# Patient Record
Sex: FEMALE | Race: WHITE | NOT HISPANIC OR LATINO | Employment: OTHER | URBAN - METROPOLITAN AREA
[De-identification: names, ages, dates, MRNs, and addresses within clinical notes are randomized per-mention and may not be internally consistent; named-entity substitution may affect disease eponyms.]

---

## 2022-01-14 DIAGNOSIS — Z01.818 PRE-OP TESTING: Primary | ICD-10-CM

## 2022-01-17 ENCOUNTER — ANESTHESIA EVENT (OUTPATIENT)
Dept: PERIOP | Facility: HOSPITAL | Age: 72
DRG: 470 | End: 2022-01-17
Payer: COMMERCIAL

## 2022-01-25 ENCOUNTER — APPOINTMENT (OUTPATIENT)
Dept: LAB | Facility: MEDICAL CENTER | Age: 72
End: 2022-01-25
Payer: COMMERCIAL

## 2022-01-25 ENCOUNTER — APPOINTMENT (OUTPATIENT)
Dept: RADIOLOGY | Facility: MEDICAL CENTER | Age: 72
End: 2022-01-25
Payer: COMMERCIAL

## 2022-01-25 ENCOUNTER — CLINICAL SUPPORT (OUTPATIENT)
Dept: URGENT CARE | Facility: MEDICAL CENTER | Age: 72
End: 2022-01-25
Payer: COMMERCIAL

## 2022-01-25 DIAGNOSIS — Z01.818 PRE-OP TESTING: ICD-10-CM

## 2022-01-25 LAB
ABO GROUP BLD: NORMAL
ALBUMIN SERPL BCP-MCNC: 3.8 G/DL (ref 3.5–5)
ALP SERPL-CCNC: 122 U/L (ref 46–116)
ALT SERPL W P-5'-P-CCNC: 22 U/L (ref 12–78)
ANION GAP SERPL CALCULATED.3IONS-SCNC: 3 MMOL/L (ref 4–13)
APTT PPP: 33 SECONDS (ref 23–37)
AST SERPL W P-5'-P-CCNC: 18 U/L (ref 5–45)
ATRIAL RATE: 71 BPM
BASOPHILS # BLD AUTO: 0.08 THOUSANDS/ΜL (ref 0–0.1)
BASOPHILS NFR BLD AUTO: 2 % (ref 0–1)
BILIRUB SERPL-MCNC: 0.64 MG/DL (ref 0.2–1)
BLD GP AB SCN SERPL QL: NEGATIVE
BUN SERPL-MCNC: 18 MG/DL (ref 5–25)
CALCIUM SERPL-MCNC: 9.8 MG/DL (ref 8.3–10.1)
CHLORIDE SERPL-SCNC: 108 MMOL/L (ref 100–108)
CO2 SERPL-SCNC: 29 MMOL/L (ref 21–32)
CREAT SERPL-MCNC: 0.78 MG/DL (ref 0.6–1.3)
CRP SERPL QL: <3 MG/L
EOSINOPHIL # BLD AUTO: 0.16 THOUSAND/ΜL (ref 0–0.61)
EOSINOPHIL NFR BLD AUTO: 3 % (ref 0–6)
ERYTHROCYTE [DISTWIDTH] IN BLOOD BY AUTOMATED COUNT: 16.3 % (ref 11.6–15.1)
EST. AVERAGE GLUCOSE BLD GHB EST-MCNC: 128 MG/DL
FERRITIN SERPL-MCNC: 5 NG/ML (ref 8–388)
GFR SERPL CREATININE-BSD FRML MDRD: 75 ML/MIN/1.73SQ M
GLUCOSE P FAST SERPL-MCNC: 109 MG/DL (ref 65–99)
HBA1C MFR BLD: 6.1 %
HCT VFR BLD AUTO: 37.1 % (ref 34.8–46.1)
HGB BLD-MCNC: 11.2 G/DL (ref 11.5–15.4)
IMM GRANULOCYTES # BLD AUTO: 0.01 THOUSAND/UL (ref 0–0.2)
IMM GRANULOCYTES NFR BLD AUTO: 0 % (ref 0–2)
INR PPP: 1.02 (ref 0.84–1.19)
IRON SATN MFR SERPL: 9 % (ref 15–50)
IRON SERPL-MCNC: 41 UG/DL (ref 50–170)
LYMPHOCYTES # BLD AUTO: 1.1 THOUSANDS/ΜL (ref 0.6–4.47)
LYMPHOCYTES NFR BLD AUTO: 20 % (ref 14–44)
MCH RBC QN AUTO: 25.2 PG (ref 26.8–34.3)
MCHC RBC AUTO-ENTMCNC: 30.2 G/DL (ref 31.4–37.4)
MCV RBC AUTO: 83 FL (ref 82–98)
MONOCYTES # BLD AUTO: 0.42 THOUSAND/ΜL (ref 0.17–1.22)
MONOCYTES NFR BLD AUTO: 8 % (ref 4–12)
NEUTROPHILS # BLD AUTO: 3.64 THOUSANDS/ΜL (ref 1.85–7.62)
NEUTS SEG NFR BLD AUTO: 67 % (ref 43–75)
NRBC BLD AUTO-RTO: 0 /100 WBCS
PLATELET # BLD AUTO: 188 THOUSANDS/UL (ref 149–390)
POTASSIUM SERPL-SCNC: 4.1 MMOL/L (ref 3.5–5.3)
PR INTERVAL: 166 MS
PROT SERPL-MCNC: 7.2 G/DL (ref 6.4–8.2)
PROTHROMBIN TIME: 13 SECONDS (ref 11.6–14.5)
QRS AXIS: -29 DEGREES
QRSD INTERVAL: 70 MS
QT INTERVAL: 420 MS
QTC INTERVAL: 456 MS
RBC # BLD AUTO: 4.45 MILLION/UL (ref 3.81–5.12)
RETICS # AUTO: NORMAL 10*3/UL (ref 14097–95744)
RETICS # CALC: 1.15 % (ref 0.37–1.87)
RH BLD: POSITIVE
SODIUM SERPL-SCNC: 140 MMOL/L (ref 136–145)
SPECIMEN EXPIRATION DATE: NORMAL
T WAVE AXIS: 15 DEGREES
TIBC SERPL-MCNC: 477 UG/DL (ref 250–450)
VENTRICULAR RATE: 71 BPM
WBC # BLD AUTO: 5.41 THOUSAND/UL (ref 4.31–10.16)

## 2022-01-25 PROCEDURE — 83540 ASSAY OF IRON: CPT

## 2022-01-25 PROCEDURE — 82728 ASSAY OF FERRITIN: CPT

## 2022-01-25 PROCEDURE — 85730 THROMBOPLASTIN TIME PARTIAL: CPT

## 2022-01-25 PROCEDURE — 80053 COMPREHEN METABOLIC PANEL: CPT

## 2022-01-25 PROCEDURE — 93010 ELECTROCARDIOGRAM REPORT: CPT | Performed by: INTERNAL MEDICINE

## 2022-01-25 PROCEDURE — 83550 IRON BINDING TEST: CPT

## 2022-01-25 PROCEDURE — 83036 HEMOGLOBIN GLYCOSYLATED A1C: CPT

## 2022-01-25 PROCEDURE — 86850 RBC ANTIBODY SCREEN: CPT

## 2022-01-25 PROCEDURE — 71046 X-RAY EXAM CHEST 2 VIEWS: CPT

## 2022-01-25 PROCEDURE — 86900 BLOOD TYPING SEROLOGIC ABO: CPT

## 2022-01-25 PROCEDURE — 86140 C-REACTIVE PROTEIN: CPT

## 2022-01-25 PROCEDURE — 85045 AUTOMATED RETICULOCYTE COUNT: CPT

## 2022-01-25 PROCEDURE — 93005 ELECTROCARDIOGRAM TRACING: CPT

## 2022-01-25 PROCEDURE — 36415 COLL VENOUS BLD VENIPUNCTURE: CPT

## 2022-01-25 PROCEDURE — 85610 PROTHROMBIN TIME: CPT

## 2022-01-25 PROCEDURE — 86901 BLOOD TYPING SEROLOGIC RH(D): CPT

## 2022-01-25 PROCEDURE — 85025 COMPLETE CBC W/AUTO DIFF WBC: CPT

## 2022-02-08 RX ORDER — AMLODIPINE BESYLATE 5 MG/1
5 TABLET ORAL DAILY
COMMUNITY

## 2022-02-08 RX ORDER — ACETAMINOPHEN 500 MG
500-1000 TABLET ORAL EVERY 6 HOURS PRN
COMMUNITY

## 2022-02-08 RX ORDER — VENLAFAXINE HYDROCHLORIDE 150 MG/1
150 CAPSULE, EXTENDED RELEASE ORAL DAILY
COMMUNITY

## 2022-02-08 RX ORDER — FERROUS SULFATE 325(65) MG
325 TABLET ORAL
COMMUNITY

## 2022-02-08 RX ORDER — LEVOTHYROXINE SODIUM 137 UG/1
CAPSULE ORAL
COMMUNITY

## 2022-02-08 RX ORDER — ASPIRIN 81 MG/1
81 TABLET ORAL DAILY
Status: ON HOLD | COMMUNITY
End: 2022-02-17 | Stop reason: SDUPTHER

## 2022-02-08 NOTE — PRE-PROCEDURE INSTRUCTIONS
Pre-Surgery Instructions:   Medication Instructions    acetaminophen (TYLENOL) 500 mg tablet Instructed patient per Anesthesia Guidelines   amLODIPine (NORVASC) 5 mg tablet Instructed patient per Anesthesia Guidelines   aspirin (ECOTRIN LOW STRENGTH) 81 mg EC tablet Patient was instructed by Physician and understands   Calcium-Vitamin D-Vitamin K (VIACTIV PO) Instructed patient per Anesthesia Guidelines   ferrous sulfate 325 (65 Fe) mg tablet Instructed patient per Anesthesia Guidelines   Levothyroxine Sodium 137 MCG CAPS Instructed patient per Anesthesia Guidelines   Multiple Vitamins-Minerals (CENTRUM SILVER PO) Instructed patient per Anesthesia Guidelines   venlafaxine (EFFEXOR-XR) 150 mg 24 hr capsule Instructed patient per Anesthesia Guidelines  Instructed to take amlodipine/ venlafaxine and levothyroxine am of surgery with sip of water per anesthesia guidelines  Can take tylenol if needed am of surgery

## 2022-02-15 ENCOUNTER — ANESTHESIA (OUTPATIENT)
Dept: PERIOP | Facility: HOSPITAL | Age: 72
DRG: 470 | End: 2022-02-15
Payer: COMMERCIAL

## 2022-02-15 ENCOUNTER — APPOINTMENT (OUTPATIENT)
Dept: RADIOLOGY | Facility: HOSPITAL | Age: 72
DRG: 470 | End: 2022-02-15
Payer: COMMERCIAL

## 2022-02-15 ENCOUNTER — HOSPITAL ENCOUNTER (INPATIENT)
Facility: HOSPITAL | Age: 72
LOS: 2 days | Discharge: HOME HEALTH | DRG: 470 | End: 2022-02-18
Attending: ORTHOPAEDIC SURGERY | Admitting: ORTHOPAEDIC SURGERY
Payer: COMMERCIAL

## 2022-02-15 DIAGNOSIS — Z96.651 STATUS POST RIGHT KNEE REPLACEMENT: Primary | ICD-10-CM

## 2022-02-15 DIAGNOSIS — Z96.651 S/P TKR (TOTAL KNEE REPLACEMENT) USING CEMENT, RIGHT: ICD-10-CM

## 2022-02-15 PROBLEM — E66.9 OBESITY: Status: ACTIVE | Noted: 2022-02-15

## 2022-02-15 LAB
ABO GROUP BLD: NORMAL
ERYTHROCYTE [DISTWIDTH] IN BLOOD BY AUTOMATED COUNT: 19.3 % (ref 11.6–15.1)
FLUAV RNA RESP QL NAA+PROBE: NEGATIVE
FLUBV RNA RESP QL NAA+PROBE: NEGATIVE
HCT VFR BLD AUTO: 36.7 % (ref 34.8–46.1)
HGB BLD-MCNC: 11.4 G/DL (ref 11.5–15.4)
MCH RBC QN AUTO: 27.3 PG (ref 26.8–34.3)
MCHC RBC AUTO-ENTMCNC: 31.1 G/DL (ref 31.4–37.4)
MCV RBC AUTO: 88 FL (ref 82–98)
PLATELET # BLD AUTO: 132 THOUSANDS/UL (ref 149–390)
RBC # BLD AUTO: 4.17 MILLION/UL (ref 3.81–5.12)
RH BLD: POSITIVE
RSV RNA RESP QL NAA+PROBE: NEGATIVE
SARS-COV-2 RNA RESP QL NAA+PROBE: NEGATIVE
WBC # BLD AUTO: 9.29 THOUSAND/UL (ref 4.31–10.16)

## 2022-02-15 PROCEDURE — C1776 JOINT DEVICE (IMPLANTABLE): HCPCS | Performed by: ORTHOPAEDIC SURGERY

## 2022-02-15 PROCEDURE — 73560 X-RAY EXAM OF KNEE 1 OR 2: CPT

## 2022-02-15 PROCEDURE — 97163 PT EVAL HIGH COMPLEX 45 MIN: CPT

## 2022-02-15 PROCEDURE — C1713 ANCHOR/SCREW BN/BN,TIS/BN: HCPCS | Performed by: ORTHOPAEDIC SURGERY

## 2022-02-15 PROCEDURE — 85027 COMPLETE CBC AUTOMATED: CPT | Performed by: ORTHOPAEDIC SURGERY

## 2022-02-15 PROCEDURE — 0SRC0J9 REPLACEMENT OF RIGHT KNEE JOINT WITH SYNTHETIC SUBSTITUTE, CEMENTED, OPEN APPROACH: ICD-10-PCS | Performed by: ORTHOPAEDIC SURGERY

## 2022-02-15 PROCEDURE — 0241U HB NFCT DS VIR RESP RNA 4 TRGT: CPT | Performed by: ORTHOPAEDIC SURGERY

## 2022-02-15 DEVICE — SIGMA FEMORAL POSTERIOR STABILIZED CEMENTED SIZE 4N RIGHT
Type: IMPLANTABLE DEVICE | Site: KNEE | Status: FUNCTIONAL
Brand: SIGMA

## 2022-02-15 DEVICE — P.F.C. SIGMA TIBIAL TRAY FIXED BEARING MODULAR COCR 3 CEMENTED
Type: IMPLANTABLE DEVICE | Site: KNEE | Status: FUNCTIONAL
Brand: P.F.C. SIGMA

## 2022-02-15 DEVICE — SMARTSET HIGH PERFORMANCE MV MEDIUM VISCOSITY BONE CEMENT 40G
Type: IMPLANTABLE DEVICE | Site: KNEE | Status: FUNCTIONAL
Brand: SMARTSET

## 2022-02-15 DEVICE — P.F.C. SIGMA TIBIAL INSERT FIXED BEARING STABILIZED 3 8MM XLK
Type: IMPLANTABLE DEVICE | Site: KNEE | Status: FUNCTIONAL
Brand: P.F.C. SIGMA

## 2022-02-15 RX ORDER — NEOSTIGMINE METHYLSULFATE 1 MG/ML
INJECTION INTRAVENOUS AS NEEDED
Status: DISCONTINUED | OUTPATIENT
Start: 2022-02-15 | End: 2022-02-15

## 2022-02-15 RX ORDER — MIDAZOLAM HYDROCHLORIDE 2 MG/2ML
INJECTION, SOLUTION INTRAMUSCULAR; INTRAVENOUS AS NEEDED
Status: DISCONTINUED | OUTPATIENT
Start: 2022-02-15 | End: 2022-02-15

## 2022-02-15 RX ORDER — ROPIVACAINE HYDROCHLORIDE 5 MG/ML
INJECTION, SOLUTION EPIDURAL; INFILTRATION; PERINEURAL AS NEEDED
Status: DISCONTINUED | OUTPATIENT
Start: 2022-02-15 | End: 2022-02-15

## 2022-02-15 RX ORDER — OXYCODONE HCL 10 MG/1
10 TABLET, FILM COATED, EXTENDED RELEASE ORAL EVERY 12 HOURS SCHEDULED
Status: DISCONTINUED | OUTPATIENT
Start: 2022-02-15 | End: 2022-02-18 | Stop reason: HOSPADM

## 2022-02-15 RX ORDER — ROCURONIUM BROMIDE 10 MG/ML
INJECTION, SOLUTION INTRAVENOUS AS NEEDED
Status: DISCONTINUED | OUTPATIENT
Start: 2022-02-15 | End: 2022-02-15

## 2022-02-15 RX ORDER — ONDANSETRON 4 MG/1
4 TABLET, ORALLY DISINTEGRATING ORAL EVERY 6 HOURS PRN
Status: DISCONTINUED | OUTPATIENT
Start: 2022-02-15 | End: 2022-02-18 | Stop reason: HOSPADM

## 2022-02-15 RX ORDER — FENTANYL CITRATE/PF 50 MCG/ML
25 SYRINGE (ML) INJECTION
Status: DISCONTINUED | OUTPATIENT
Start: 2022-02-15 | End: 2022-02-15 | Stop reason: HOSPADM

## 2022-02-15 RX ORDER — DOCUSATE SODIUM 100 MG/1
100 CAPSULE, LIQUID FILLED ORAL 2 TIMES DAILY
Status: DISCONTINUED | OUTPATIENT
Start: 2022-02-15 | End: 2022-02-18 | Stop reason: HOSPADM

## 2022-02-15 RX ORDER — MAGNESIUM HYDROXIDE 1200 MG/15ML
LIQUID ORAL AS NEEDED
Status: DISCONTINUED | OUTPATIENT
Start: 2022-02-15 | End: 2022-02-15 | Stop reason: HOSPADM

## 2022-02-15 RX ORDER — ONDANSETRON 2 MG/ML
INJECTION INTRAMUSCULAR; INTRAVENOUS AS NEEDED
Status: DISCONTINUED | OUTPATIENT
Start: 2022-02-15 | End: 2022-02-15

## 2022-02-15 RX ORDER — KETOROLAC TROMETHAMINE 30 MG/ML
15 INJECTION, SOLUTION INTRAMUSCULAR; INTRAVENOUS EVERY 6 HOURS PRN
Status: DISCONTINUED | OUTPATIENT
Start: 2022-02-15 | End: 2022-02-16

## 2022-02-15 RX ORDER — EPHEDRINE SULFATE 50 MG/ML
INJECTION INTRAVENOUS AS NEEDED
Status: DISCONTINUED | OUTPATIENT
Start: 2022-02-15 | End: 2022-02-15

## 2022-02-15 RX ORDER — OXYCODONE HYDROCHLORIDE AND ACETAMINOPHEN 5; 325 MG/1; MG/1
1 TABLET ORAL EVERY 4 HOURS PRN
Status: DISCONTINUED | OUTPATIENT
Start: 2022-02-15 | End: 2022-02-18 | Stop reason: HOSPADM

## 2022-02-15 RX ORDER — HYDROMORPHONE HCL/PF 1 MG/ML
0.5 SYRINGE (ML) INJECTION EVERY 2 HOUR PRN
Status: ACTIVE | OUTPATIENT
Start: 2022-02-15 | End: 2022-02-17

## 2022-02-15 RX ORDER — CHLORHEXIDINE GLUCONATE 4 G/100ML
SOLUTION TOPICAL DAILY PRN
Status: DISCONTINUED | OUTPATIENT
Start: 2022-02-15 | End: 2022-02-15 | Stop reason: HOSPADM

## 2022-02-15 RX ORDER — DEXAMETHASONE SODIUM PHOSPHATE 4 MG/ML
INJECTION, SOLUTION INTRA-ARTICULAR; INTRALESIONAL; INTRAMUSCULAR; INTRAVENOUS; SOFT TISSUE AS NEEDED
Status: DISCONTINUED | OUTPATIENT
Start: 2022-02-15 | End: 2022-02-15

## 2022-02-15 RX ORDER — SODIUM CHLORIDE, SODIUM LACTATE, POTASSIUM CHLORIDE, CALCIUM CHLORIDE 600; 310; 30; 20 MG/100ML; MG/100ML; MG/100ML; MG/100ML
125 INJECTION, SOLUTION INTRAVENOUS CONTINUOUS
Status: DISCONTINUED | OUTPATIENT
Start: 2022-02-15 | End: 2022-02-16

## 2022-02-15 RX ORDER — VANCOMYCIN HYDROCHLORIDE 1 G/200ML
1000 INJECTION, SOLUTION INTRAVENOUS EVERY 12 HOURS
Status: COMPLETED | OUTPATIENT
Start: 2022-02-15 | End: 2022-02-16

## 2022-02-15 RX ORDER — FENTANYL CITRATE 50 UG/ML
INJECTION, SOLUTION INTRAMUSCULAR; INTRAVENOUS AS NEEDED
Status: DISCONTINUED | OUTPATIENT
Start: 2022-02-15 | End: 2022-02-15

## 2022-02-15 RX ORDER — VANCOMYCIN HYDROCHLORIDE 1 G/200ML
1000 INJECTION, SOLUTION INTRAVENOUS ONCE
Status: COMPLETED | OUTPATIENT
Start: 2022-02-15 | End: 2022-02-15

## 2022-02-15 RX ORDER — HYDROMORPHONE HCL/PF 1 MG/ML
0.2 SYRINGE (ML) INJECTION EVERY 6 HOURS PRN
Status: DISCONTINUED | OUTPATIENT
Start: 2022-02-15 | End: 2022-02-18 | Stop reason: HOSPADM

## 2022-02-15 RX ORDER — GLYCOPYRROLATE 0.2 MG/ML
INJECTION INTRAMUSCULAR; INTRAVENOUS AS NEEDED
Status: DISCONTINUED | OUTPATIENT
Start: 2022-02-15 | End: 2022-02-15

## 2022-02-15 RX ORDER — ONDANSETRON 2 MG/ML
4 INJECTION INTRAMUSCULAR; INTRAVENOUS ONCE AS NEEDED
Status: DISCONTINUED | OUTPATIENT
Start: 2022-02-15 | End: 2022-02-15 | Stop reason: HOSPADM

## 2022-02-15 RX ORDER — HYDROMORPHONE HCL/PF 1 MG/ML
0.5 SYRINGE (ML) INJECTION
Status: DISCONTINUED | OUTPATIENT
Start: 2022-02-15 | End: 2022-02-15 | Stop reason: HOSPADM

## 2022-02-15 RX ORDER — CHLORHEXIDINE GLUCONATE 0.12 MG/ML
15 RINSE ORAL ONCE
Status: COMPLETED | OUTPATIENT
Start: 2022-02-15 | End: 2022-02-15

## 2022-02-15 RX ORDER — FONDAPARINUX SODIUM 2.5 MG/.5ML
2.5 INJECTION SUBCUTANEOUS DAILY
Status: DISCONTINUED | OUTPATIENT
Start: 2022-02-15 | End: 2022-02-18 | Stop reason: HOSPADM

## 2022-02-15 RX ORDER — ACETAMINOPHEN 325 MG/1
650 TABLET ORAL EVERY 4 HOURS PRN
Status: DISCONTINUED | OUTPATIENT
Start: 2022-02-15 | End: 2022-02-18 | Stop reason: HOSPADM

## 2022-02-15 RX ORDER — PROPOFOL 10 MG/ML
INJECTION, EMULSION INTRAVENOUS AS NEEDED
Status: DISCONTINUED | OUTPATIENT
Start: 2022-02-15 | End: 2022-02-15

## 2022-02-15 RX ORDER — LIDOCAINE HYDROCHLORIDE 20 MG/ML
INJECTION, SOLUTION EPIDURAL; INFILTRATION; INTRACAUDAL; PERINEURAL AS NEEDED
Status: DISCONTINUED | OUTPATIENT
Start: 2022-02-15 | End: 2022-02-15

## 2022-02-15 RX ADMIN — ROPIVACAINE HYDROCHLORIDE 15 ML: 5 INJECTION, SOLUTION EPIDURAL; INFILTRATION; PERINEURAL at 10:23

## 2022-02-15 RX ADMIN — FENTANYL CITRATE 25 MCG: 50 INJECTION, SOLUTION INTRAMUSCULAR; INTRAVENOUS at 14:17

## 2022-02-15 RX ADMIN — CHLORHEXIDINE GLUCONATE 0.12% ORAL RINSE 15 ML: 1.2 LIQUID ORAL at 08:37

## 2022-02-15 RX ADMIN — FENTANYL CITRATE 100 MCG: 50 INJECTION INTRAMUSCULAR; INTRAVENOUS at 10:16

## 2022-02-15 RX ADMIN — EPHEDRINE SULFATE 10 MG: 50 INJECTION, SOLUTION INTRAVENOUS at 10:59

## 2022-02-15 RX ADMIN — OXYCODONE HYDROCHLORIDE 10 MG: 10 TABLET, FILM COATED, EXTENDED RELEASE ORAL at 20:47

## 2022-02-15 RX ADMIN — SODIUM CHLORIDE, SODIUM LACTATE, POTASSIUM CHLORIDE, AND CALCIUM CHLORIDE 125 ML/HR: .6; .31; .03; .02 INJECTION, SOLUTION INTRAVENOUS at 15:07

## 2022-02-15 RX ADMIN — FENTANYL CITRATE 25 MCG: 50 INJECTION, SOLUTION INTRAMUSCULAR; INTRAVENOUS at 14:06

## 2022-02-15 RX ADMIN — HYDROMORPHONE HYDROCHLORIDE 0.5 MG: 1 INJECTION, SOLUTION INTRAMUSCULAR; INTRAVENOUS; SUBCUTANEOUS at 14:44

## 2022-02-15 RX ADMIN — FONDAPARINUX SODIUM 2.5 MG: 2.5 INJECTION, SOLUTION SUBCUTANEOUS at 17:46

## 2022-02-15 RX ADMIN — ROCURONIUM BROMIDE 20 MG: 50 INJECTION, SOLUTION INTRAVENOUS at 12:09

## 2022-02-15 RX ADMIN — SODIUM CHLORIDE, SODIUM LACTATE, POTASSIUM CHLORIDE, AND CALCIUM CHLORIDE 125 ML/HR: .6; .31; .03; .02 INJECTION, SOLUTION INTRAVENOUS at 08:54

## 2022-02-15 RX ADMIN — EPHEDRINE SULFATE 10 MG: 50 INJECTION, SOLUTION INTRAVENOUS at 11:35

## 2022-02-15 RX ADMIN — VANCOMYCIN HYDROCHLORIDE 1000 MG: 1 INJECTION, SOLUTION INTRAVENOUS at 10:38

## 2022-02-15 RX ADMIN — SODIUM CHLORIDE, SODIUM LACTATE, POTASSIUM CHLORIDE, AND CALCIUM CHLORIDE: .6; .31; .03; .02 INJECTION, SOLUTION INTRAVENOUS at 12:03

## 2022-02-15 RX ADMIN — FENTANYL CITRATE 50 MCG: 50 INJECTION INTRAMUSCULAR; INTRAVENOUS at 10:21

## 2022-02-15 RX ADMIN — PROPOFOL 50 MG: 10 INJECTION, EMULSION INTRAVENOUS at 11:26

## 2022-02-15 RX ADMIN — VANCOMYCIN HYDROCHLORIDE 1000 MG: 1 INJECTION, SOLUTION INTRAVENOUS at 21:34

## 2022-02-15 RX ADMIN — PROPOFOL 50 MG: 10 INJECTION, EMULSION INTRAVENOUS at 13:02

## 2022-02-15 RX ADMIN — SODIUM CHLORIDE, SODIUM LACTATE, POTASSIUM CHLORIDE, AND CALCIUM CHLORIDE 125 ML/HR: .6; .31; .03; .02 INJECTION, SOLUTION INTRAVENOUS at 10:34

## 2022-02-15 RX ADMIN — EPHEDRINE SULFATE 15 MG: 50 INJECTION, SOLUTION INTRAVENOUS at 10:56

## 2022-02-15 RX ADMIN — ROPIVACAINE HYDROCHLORIDE 25 ML: 5 INJECTION, SOLUTION EPIDURAL; INFILTRATION; PERINEURAL at 10:30

## 2022-02-15 RX ADMIN — DOCUSATE SODIUM 100 MG: 100 CAPSULE ORAL at 17:46

## 2022-02-15 RX ADMIN — MIDAZOLAM 2 MG: 1 INJECTION INTRAMUSCULAR; INTRAVENOUS at 10:16

## 2022-02-15 RX ADMIN — PROPOFOL 150 MG: 10 INJECTION, EMULSION INTRAVENOUS at 10:49

## 2022-02-15 RX ADMIN — ROCURONIUM BROMIDE 50 MG: 50 INJECTION, SOLUTION INTRAVENOUS at 10:49

## 2022-02-15 RX ADMIN — DEXAMETHASONE SODIUM PHOSPHATE 8 MG: 4 INJECTION INTRA-ARTICULAR; INTRALESIONAL; INTRAMUSCULAR; INTRAVENOUS; SOFT TISSUE at 10:49

## 2022-02-15 RX ADMIN — LIDOCAINE HYDROCHLORIDE 100 MG: 20 INJECTION, SOLUTION EPIDURAL; INFILTRATION; INTRACAUDAL; PERINEURAL at 10:49

## 2022-02-15 RX ADMIN — GLYCOPYRROLATE 0.4 MG: 0.2 INJECTION, SOLUTION INTRAMUSCULAR; INTRAVENOUS at 13:06

## 2022-02-15 RX ADMIN — NEOSTIGMINE METHYLSULFATE 3 MG: 1 INJECTION INTRAVENOUS at 13:06

## 2022-02-15 RX ADMIN — ONDANSETRON 4 MG: 2 INJECTION INTRAMUSCULAR; INTRAVENOUS at 12:52

## 2022-02-15 RX ADMIN — ROCURONIUM BROMIDE 20 MG: 50 INJECTION, SOLUTION INTRAVENOUS at 11:37

## 2022-02-15 RX ADMIN — OXYCODONE HYDROCHLORIDE AND ACETAMINOPHEN 1 TABLET: 5; 325 TABLET ORAL at 16:29

## 2022-02-15 RX ADMIN — TRANEXAMIC ACID 1000 MG: 1 INJECTION, SOLUTION INTRAVENOUS at 10:57

## 2022-02-15 RX ADMIN — FENTANYL CITRATE 50 MCG: 50 INJECTION INTRAMUSCULAR; INTRAVENOUS at 11:25

## 2022-02-15 NOTE — OP NOTE
PERATIVE REPORT  PATIENT NAME: Lalito Kitchen    :  1950  MRN: [id number]  Pt Location: [facility] OR ROOM 03    SURGERY DATE: 2/15/2022    Surgeon(s) and Role:     * Solo Connell MD - Primary    Preop Diagnosis:  Pain in right knee [M25 561]  Unilateral primary osteoarthritis, right knee [M17 11]    Post-Op Diagnosis Codes:     * Pain in right knee [M25 561]     * Unilateral primary osteoarthritis, right knee [M17 11]    Procedure(s) (LRB):  ARTHROPLASTY KNEE TOTAL (Right)    Specimen(s):  * No specimens in log *    Estimated Blood Loss:   50 mL    Drains:  Autologus Reinfusion/Drain Device 1 Right Knee (Active)   Number of days: 0       [REMOVED] Closed/Suction Drain Right Leg 10 Fr  (Removed)   Number of days: 0       Anesthesia Type:   General w/ Femoral / Sciatic Block    Operative Indications:  Pain in right knee [M25 561]  Unilateral primary osteoarthritis, right knee [M17 11]  Pain    Operative Findings:    Tricompartmental DJD right knee    Complications:   None  None    Procedure and Technique:  Right TKA DePuy SIGMA posterior stabilized / 4 narrow femur / 3 tibia / 8 mm poly with stem for stabilization / reinfusion 1/ inch hemovac  I was present for the entire procedureDATE OF PROCEDURE: 2/15/2022    PREOPERATIVE DIAGNOSIS: right knee degenerative joint disease, pain, with failed conservative treatment  POSTOPERATIVE DIAGNOSIS: right knee degenerative joint disease, pain, with failed conservative treatment  PROCEDURES:    1  Right  knee arthroplasty, a DePuy/Max and Max Sigma knee Por[place]oat femur number 4 narrow posterior stabilized, cemented tibia number 3, posterior stabilized 8 mm cross-link polyethylene insert  This is a posterior cruciate- retaining knee  2  I also did a circum-patellar neuroplasty and lateral retinacular release      SURGEON: Tania Ahmadi MD     ASSISTANT: Chantal Tovar CST    SPECIMENS:  * No specimens in log *    Estimated Blood Loss:   50 mL    Anesthesia Type:   General w/ Femoral - Sciatic nerve Block     DESCRIPTION OF PROCEDURE: After identification of the patient in PACU as Syble Eduardo, identifying the right knee as the proper operative site, discussing the benefits and risks of surgery, we proceeded to the operating room  A femoral sciatic nerve block and general anesthesia, with appropriate prophylactic antibiotics were provided by the Department of Anesthesia  The non operative lower extremity had compression stocking and SCD placed prior to prep  In the supine position on the OR table, the right lower extremity had a pneumatic tourniquet placed on its most proximal aspect  The lower extremity was washed with chloroxylenol for 8 minutes  This was preceded by an 8-minute chloroxylenol scrub at home this morning before corning to the hospital  The chloroxylenol 8-minute scrub in the OR was followed by chlorhexidine prep with 2 prep sticks     There was meticulous sterile draping of the right lower extremity by the surgeon    The limb was exsanguinated  A midline access followed by median para patellar incision allowed access to the knee  The patella was everted and there was grade III arthritis AND  chondromalacia, and there were peripheral osteophytes and I did remove those with a rongeur  A circum-patellar neuroplasty was performed at completion of the procedure  I did a lateral retinacular release centering the patella in the trochlear notch at the completion of the procedure3  TThe J&J surgical technique manual was followed meticulously sizing the tibia to a number 3 with a 0 degree posterior slope and the femur to a number 4 Narrow with 0 degrees of valgus alignment and meticulous attention to preservation of the cruciate and collateral ligaments  The trial components fit hand in glove with extension at 0 degrees, flexion at 120 degrees with no valgus, varus, anterior or posterior drawer instability in flexion or extension   The trial components were removed  The flexion /extension space was rectanglar in flexion and extension  The tibia was prepared to accept the cement  The tibial component was held in place while the cement hardened  Excess cement was removed and the cemented femur was a tight hand in glove fit  The 8 mm polyethylene was the ideal spacer, again with excellent stability and range of motion, posterior cruciate ligament still excised copious irrigation throughout the procedure and meticulous attention to sterile technique throughout the procedure  There was no ligament instability on testing in extension / flexion to 90 degrees and midflexion  The patella was arthritic  with chondromalacia  and DJD  I did perform a lateral retinacular release at the completion of the procedure to reduce the patella anatomically in the trochlear notch of the femoral prosthesis with anatomic tracking and no subluxation  I also performed a circumpatellar neuroplasty  There were  spurs to remove which I did with a rongeur  Ideally a patellar resurfacing would have been good but the patella was small and atrophic measuing on 18-19 mm in thickness prohibiting the patellar prosthesis risking loosening and fractures  The reinfusion 1/8  Hemovac was brought out the lateral aspect of the thigh  A #2 FiberWire suture was used to close the extensor retinaculum and 2-0 Monocryl inverted simple sutures in the subcutaneous  Subcutaneous fat was approximated with #1 Vicryl suture  Staples were used to approximate the skin and a soft, sterile bulky dressing was applied  Excellent alignment of leg at the completion of the procedure and neurovascularly unchanged  A large soft modified Mecca Crisp dressing was applied at the completion of the procedure prior to taking the patient to the recovery room  Blood loss was minimal  Tourniquet was deflated during closure and there was no excessive bleeding  Neurovascular stable and unchanged          Patient Disposition:  hemodynamically stable   good  Stable      SIGNATURE: Nydia Moses MD  DATE: February 15, 2022  TIME: 1:51 PM

## 2022-02-15 NOTE — PLAN OF CARE
Problem: PHYSICAL THERAPY ADULT  Goal: Performs mobility at highest level of function for planned discharge setting  See evaluation for individualized goals  Description: Treatment/Interventions: Functional transfer training,LE strengthening/ROM,Elevations,Therapeutic exercise,Endurance training,Patient/family training,Equipment eval/education,Bed mobility,Gait training,Compensatory technique education,Continued evaluation,Spoke to nursing,Family  Equipment Recommended: Walker,Other (Comment) (JAGJIT RENO)       See flowsheet documentation for full assessment, interventions and recommendations  Note: Prognosis: Good  Problem List: Decreased strength,Decreased range of motion,Decreased endurance,Impaired balance,Decreased mobility,Obesity,Decreased skin integrity,Orthopedic restrictions,Pain  Assessment: Cristi Joyner is a 65 y/o female with hx of obesity, arthritis, HTN, depression, anxiety, asthma, hypothyroid, who presents for R TKR  PT consulted  WBAT  Up with assist orders  Prior to admission independent with rare use of cane   + hx of R knee buckling with 1 fall  Drives  Resides with spouse in one story home with 1+1 EMERSON  Spouse present and reports home to provide assist prn  Currently presents with functional limitations related to decreased RLE knee ROM and strength, decreased functional mobility, balance, and ability to perform locomotion  Aysha for transfers and to take 1-2 steps toward [facility] at bedside  Returned to supine upon completion of session  Given impairments will benefit from skilled PT in order to facilitate outcomes p TKR  The patient's AM-PAC Basic Mobility Inpatient Short Form Raw Score is 16  A Raw score of greater than 16 suggests the patient may benefit from discharge to home  Please also refer to the recommendation of the Physical Therapist for safe discharge planning  Anticipate home with PT when medically stable  JAGJIT RENO             PT Discharge Recommendation: Home with home health rehabilitation          See flowsheet documentation for full assessment

## 2022-02-15 NOTE — ANESTHESIA PREPROCEDURE EVALUATION
Procedure:  ARTHROPLASTY KNEE TOTAL (Right Knee)    Relevant Problems   CARDIO   (+) Hypertension      PULMONARY   (+) Asthma        Physical Exam    Airway    Mallampati score: II  TM Distance: >3 FB  Neck ROM: full     Dental       Cardiovascular  Rhythm: regular, Rate: normal,     Pulmonary  Breath sounds clear to auscultation,     Other Findings        Anesthesia Plan  ASA Score- 3     Anesthesia Type- spinal with ASA Monitors  Additional Monitors:   Airway Plan:     Comment: Block abductor  Plan Factors-Exercise tolerance (METS): >4 METS  Chart reviewed  EKG reviewed  Existing labs reviewed  Patient summary reviewed  Patient is not a current smoker  Patient not instructed to abstain from smoking on day of procedure  Patient did not smoke on day of surgery  Obstructive sleep apnea risk education given perioperatively  Induction- intravenous  Postoperative Plan-     Informed Consent- Anesthetic plan and risks discussed with patient

## 2022-02-15 NOTE — INTERVAL H&P NOTE
H&P reviewed  After examining the patient I find no changes in the patients condition since the H&P had been written      Vitals:    02/15/22 1036   BP: 117/54   Pulse: 66   Resp: 16   Temp:    SpO2: 99%

## 2022-02-15 NOTE — PHYSICAL THERAPY NOTE
PT EVALUATION 17:00-17:30 ( 30 minutes)    66 y o     [id number]    Pain in right knee [M25 561]  Unilateral primary osteoarthritis, right knee [M17 11]    Past Medical History:   Diagnosis Date    Anemia     Anxiety     Arthritis     right knee    Asthma     childhood    Chronic sore throat     since intubation yrs ago    Depression     Disease of thyroid gland     hypothyroid    Headache     frequently    Hx of gastric bypass     Hypertension     Wears dentures     full uppers         Past Surgical History:   Procedure Laterality Date    APPENDECTOMY      CATARACT EXTRACTION Bilateral     CHOLECYSTECTOMY      COLECTOMY      COLONOSCOPY      GASTRIC BYPASS      HYSTERECTOMY          02/15/22 1700   PT Last Visit   PT Visit Date 02/15/22   Note Type   Note type Evaluation   Pain Assessment   Pain Score 4   Pain Location/Orientation Orientation: Right;Location: Knee   Restrictions/Precautions   Weight Bearing Precautions Per Order Yes   RLE Weight Bearing Per Order WBAT   Other Precautions WBS; Fall Risk;Pain;Multiple lines  (sure trans drain R leg  no clark)   Home Living   Type of [address] [place] [address] One level  (1+1 EMERSON)   Bathroom Shower/Tub Walk-in shower   Bathroom Toilet Standard   Bathroom Equipment Grab bars in shower; Shower chair   Bathroom Accessibility Accessible   Home Equipment Cane   Additional Comments resides wtih spouse in one storyhome  1+1 EMERSON  Spouse availble to assist      Prior Function   Level of [place] Independent with ADLs and functional mobility   Lives With Spouse   Receives Help From Family   ADL Assistance Independent   IADLs Independent   Falls in the last 6 months 1 to 4  (1 related to knee giving out )   Comments I PTA with rare use of cane  General   Additional Pertinent History Pt is 67 y/o female admitted for R TKR  WBAT  Up with assist orders      Family/Caregiver Present Yes   Cognition   Overall Cognitive Status [facility]   Arousal/Participation Alert Orientation Level Oriented X4   Following Commands Follows multistep commands without difficulty   Comments Pleasant   Subjective   Subjective " I heard I would be moving today, this is great!"  Wants to keep moving  RUE Assessment   RUE Assessment WFL   LUE Assessment   LUE Assessment WFL   RLE Assessment   RLE Assessment X  (grossly <3-/5)   LLE Assessment   LLE Assessment WFL   Light Touch   RLE Light Touch Grossly intact   LLE Light Touch Grossly intact   Bed Mobility   Supine to Sit 3  Moderate assistance   Additional items Assist x 1; Increased time required   Sit to Supine 4  Minimal assistance   Additional items Assist x 1; Increased time required;Verbal cues;LE management   Additional Comments Increased time  Transfers   Sit to Stand 4  Minimal assistance   Additional items Assist x 1; Increased time required;Verbal cues   Stand to Sit 4  Minimal assistance   Additional items Assist x 1; Increased time required;Verbal cues   Additional Comments cues for hand placement, increased time  Decreased RLE WB   Ambulation/Elevation   Gait pattern Improper Weight shift;Decreased foot clearance; Antalgic;Decreased R stance; Inconsistent jaycob; Short stride   Gait Assistance 4  Minimal assist   Additional items Assist x 1;Verbal cues; Tactile cues   Assistive Device Rolling walker   Distance 1-2 steps toward HOB, decreased WB RLE  Balance   Static Sitting Good   Dynamic Sitting Fair +   Static Standing Fair   Dynamic Standing Poor +   Ambulatory Poor +   Endurance Deficit   Endurance Deficit Yes   Endurance Deficit Description fatigue  RLE weakness  Activity Tolerance   Activity Tolerance Patient limited by fatigue;Treatment limited secondary to medical complications (Comment)   Medical Staff Made Aware Nurse, [address] [facility]   Nurse Made Aware yes   Assessment   Prognosis Good   Problem List Decreased strength;Decreased range of motion;Decreased endurance; Impaired balance;Decreased mobility;Obesity; Decreased skin integrity;Orthopedic restrictions;Pain   Assessment Ralph Toussaint is a 67 y/o female with hx of obesity, arthritis, HTN, depression, anxiety, asthma, hypothyroid, who presents for R TKR  PT consulted  WBAT  Up with assist orders  Prior to admission independent with rare use of cane   + hx of R knee buckling with 1 fall  Drives  Resides with spouse in one story home with 1+1 EMERSON  Spouse present and reports home to provide assist prn  Currently presents with functional limitations related to decreased RLE knee ROM and strength, decreased functional mobility, balance, and ability to perform locomotion  Parker for transfers and to take 1-2 steps toward [facility] at bedside  Returned to supine upon completion of session  Given impairments will benefit from skilled PT in order to facilitate outcomes p TKR  The patient's AM-PAC Basic Mobility Inpatient Short Form Raw Score is 16  A Raw score of greater than 16 suggests the patient may benefit from discharge to home  Please also refer to the recommendation of the Physical Therapist for safe discharge planning  Anticipate home with PT when medically stable  , [facility]  Goals   Patient Goals to walk   STG Expiration Date 02/25/22   Short Term Goal #1 10 days: 1)  Pt will perform bed mobility with Arina demonstrating appropriate technique 100% of the time in order to improve function  2)  Perform all transfers with Arina demonstrating safe and appropriate technique 100% of the time in order to improve ability to negotiate safely in home environment  3) Amb with least restrictive AD > 150'x1 with mod I in order to demonstrate ability to negotiate in home environment  4)  Improve overall strength and balance 1/2 grade in order to optimize ability to perform functional tasks and reduce fall risk  5) Increase activity tolerance to 30 minutes in order to improve endurance to functional tasks  6)  Negotiate stairs using most appropriate technique and Parker in order to be able to negotiate safely into home environment  7) PT for ongoing patient and family/caregiver education, DME needs and d/c planning in order to promote highest level of function in least restrictive environment  Plan   Treatment/Interventions Functional transfer training;LE strengthening/ROM; Elevations; Therapeutic exercise; Endurance training;Patient/family training;Equipment eval/education; Bed mobility;Gait training; Compensatory technique education;Continued evaluation;Spoke to nursing;Family   PT Frequency Twice a day   Recommendation   PT Discharge Recommendation Home with home health rehabilitation   [address]; Other (Comment)  (RW, BSC)   Walker Package Recommended Wheeled walker   AM-PAC Basic Mobility Inpatient   Turning in Bed Without Bedrails 3   Lying on Back to Sitting on Edge of Flat Bed 2   Moving Bed to Chair 3   Standing Up From Chair 3   Walk in Room 3   Climb 3-5 Stairs 2   Basic Mobility Inpatient Raw Score 16   Basic Mobility Standardized Score 38 32   Highest Level Of Mobility   JH-HLM Goal 5: Stand one or more mins   JH-HLM Highest Level of Mobility 5: Stand (1 or more minutes)   JH-HLM Goal Achieved Yes   End of Consult   Patient Position at End of Consult Supine; All needs within reach     Hx/personal factors: co-morbidities, mutliple lines, use of AD, pain, h/o of falls, fall risk, assist w/ ADL's and obesity  Examination: dec mobility, dec balance, dec endurance, dec amb, risk for falls, assessed body system, balance, endurance, amb, D/C disposition & fall risk, impairements in locomotion, musculoskeletal, balance, endurance, posture, coordination  Clinical: unpredictable (ongoing medical status, risk for falls, POD #0 and pain mgt)  Complexity: high      Michael Franks, PT

## 2022-02-15 NOTE — PLAN OF CARE
Problem: MOBILITY - ADULT  Goal: Maintain or return to baseline ADL function  Description: INTERVENTIONS:  -  Assess patient's ability to carry out ADLs; assess patient's baseline for ADL function and identify physical deficits which impact ability to perform ADLs (bathing, care of mouth/teeth, toileting, grooming, dressing, etc )  - Assess/evaluate cause of self-care deficits   - Assess range of motion  - Assess patient's mobility; develop plan if impaired  - Assess patient's need for assistive devices and provide as appropriate  - Encourage maximum independence but intervene and supervise when necessary  - Involve family in performance of ADLs  - Assess for home care needs following discharge   - Consider OT consult to assist with ADL evaluation and planning for discharge  - Provide patient education as appropriate  Outcome: Progressing  Goal: Maintains/Returns to pre admission functional level  Description: INTERVENTIONS:  - Perform BMAT or MOVE assessment daily    - Set and communicate daily mobility goal to care team and patient/family/caregiver  - Collaborate with rehabilitation services on mobility goals if consulted  - Perform Range of Motion 3 times a day  - Reposition patient every 2 hours    - Dangle patient 3 times a day  - Stand patient 3 times a day  - Ambulate patient 3 times a day  - Out of bed to chair 3 times a day   - Out of bed for meals 3 times a day  - Out of bed for toileting  - Record patient progress and toleration of activity level   Outcome: Progressing     Problem: Potential for Falls  Goal: Patient will remain free of falls  Description: INTERVENTIONS:  - Educate patient/family on patient safety including physical limitations  - Instruct patient to call for assistance with activity   - Consult OT/PT to assist with strengthening/mobility   - Keep Call bell within reach  - Keep bed low and locked with side rails adjusted as appropriate  - Keep care items and personal belongings within reach  - Initiate and maintain comfort rounds  - Make Fall Risk Sign visible to staff  - Offer Toileting every 2 Hours, in advance of need  - Initiate/Maintain alarm  - Obtain necessary fall risk management equipment:   - Apply yellow socks and bracelet for high fall risk patients  - Consider moving patient to room near nurses station  Outcome: Progressing     Problem: PAIN - ADULT  Goal: Verbalizes/displays adequate comfort level or baseline comfort level  Description: Interventions:  - Encourage patient to monitor pain and request assistance  - Assess pain using appropriate pain scale  - Administer analgesics based on type and severity of pain and evaluate response  - Implement non-pharmacological measures as appropriate and evaluate response  - Consider cultural and social influences on pain and pain management  - Notify physician/advanced practitioner if interventions unsuccessful or patient reports new pain  Outcome: Progressing     Problem: SAFETY ADULT  Goal: Maintain or return to baseline ADL function  Description: INTERVENTIONS:  -  Assess patient's ability to carry out ADLs; assess patient's baseline for ADL function and identify physical deficits which impact ability to perform ADLs (bathing, care of mouth/teeth, toileting, grooming, dressing, etc )  - Assess/evaluate cause of self-care deficits   - Assess range of motion  - Assess patient's mobility; develop plan if impaired  - Assess patient's need for assistive devices and provide as appropriate  - Encourage maximum independence but intervene and supervise when necessary  - Involve family in performance of ADLs  - Assess for home care needs following discharge   - Consider OT consult to assist with ADL evaluation and planning for discharge  - Provide patient education as appropriate  Outcome: Progressing  Goal: Maintains/Returns to pre admission functional level  Description: INTERVENTIONS:  - Perform BMAT or MOVE assessment daily    - Set and communicate daily mobility goal to care team and patient/family/caregiver  - Collaborate with rehabilitation services on mobility goals if consulted  - Perform Range of Motion 3 times a day  - Reposition patient every 3 hours    - Dangle patient 2 times a day  - Stand patient 3 times a day  - Ambulate patient 3 times a day  - Out of bed to chair 3 times a day   - Out of bed for meals 3 times a day  - Out of bed for toileting  - Record patient progress and toleration of activity level   Outcome: Progressing  Goal: Patient will remain free of falls  Description: INTERVENTIONS:  - Educate patient/family on patient safety including physical limitations  - Instruct patient to call for assistance with activity   - Consult OT/PT to assist with strengthening/mobility   - Keep Call bell within reach  - Keep bed low and locked with side rails adjusted as appropriate  - Keep care items and personal belongings within reach  - Initiate and maintain comfort rounds  - Make Fall Risk Sign visible to staff  - Offer Toileting every  Hours, in advance of need  - Initiate/Maintain alarm  - Obtain necessary fall risk management equipment:   - Apply yellow socks and bracelet for high fall risk patients  - Consider moving patient to room near nurses station  Outcome: Progressing     Problem: DISCHARGE PLANNING  Goal: Discharge to home or other facility with appropriate resources  Description: INTERVENTIONS:  - Identify barriers to discharge w/patient and caregiver  - Arrange for needed discharge resources and transportation as appropriate  - Identify discharge learning needs (meds, wound care, etc )  - Arrange for interpretive services to assist at discharge as needed  - Refer to Case Management Department for coordinating discharge planning if the patient needs post-hospital services based on physician/advanced practitioner order or complex needs related to functional status, cognitive ability, or social support system  Outcome: Progressing

## 2022-02-15 NOTE — ANESTHESIA PROCEDURE NOTES
Peripheral Block    Patient location during procedure: holding area  Start time: 2/15/2022 10:16 AM  Reason for block: at surgeon's request and post-op pain management  Staffing  Performed: Anesthesiologist   Anesthesiologist: Evgeny Erazo DO  Preanesthetic Checklist  Completed: patient identified, IV checked, site marked, risks and benefits discussed, surgical consent, monitors and equipment checked, pre-op evaluation and timeout performed  Peripheral Block  Patient position: left lateral decubitus  Prep: ChloraPrep  Patient monitoring: continuous pulse ox, frequent blood pressure checks and heart rate  Block type: sciatic and femoral  Laterality: right  Injection technique: single-shot  Procedures: ultrasound guided, Ultrasound guidance required for the procedure to increase accuracy and safety of medication placement and decrease risk of complications   and nerve stimulator  Ultrasound permanent image saved  Needle  Needle type: Stimuplex   Needle gauge: 22 G  Needle length: 15 cm  Needle localization: nerve stimulator and ultrasound guidance  Assessment  Injection assessment: incremental injection, local visualized surrounding nerve on ultrasound, negative aspiration for heme and no paresthesia on injection  Paresthesia pain: none  Heart rate change: no  Slow fractionated injection: yes  Post-procedure:  site cleaned  patient tolerated the procedure well with no immediate complications

## 2022-02-15 NOTE — ANESTHESIA POSTPROCEDURE EVALUATION
Post-Op Assessment Note    CV Status:  Stable  Pain Score: 3    Pain management: adequate     Mental Status:  Alert and awake   Hydration Status:  Euvolemic   PONV Controlled:  Controlled   Airway Patency:  Patent      Post Op Vitals Reviewed: Yes      Staff: Anesthesiologist         No complications documented      BP      Temp      Pulse     Resp      SpO2      /59   Pulse 58   Temp 98 1 °F (36 7 °C)   Resp 12   Ht 5' 6" (1 676 m)   Wt 90 3 kg (199 lb 1 2 oz)   SpO2 94%   Breastfeeding No   BMI 32 13 kg/m²

## 2022-02-16 PROBLEM — Z96.651 S/P TKR (TOTAL KNEE REPLACEMENT) USING CEMENT, RIGHT: Status: ACTIVE | Noted: 2022-02-16

## 2022-02-16 LAB
ANION GAP SERPL CALCULATED.3IONS-SCNC: 5 MMOL/L (ref 4–13)
BUN SERPL-MCNC: 18 MG/DL (ref 5–25)
CALCIUM SERPL-MCNC: 8.4 MG/DL (ref 8.3–10.1)
CHLORIDE SERPL-SCNC: 103 MMOL/L (ref 100–108)
CO2 SERPL-SCNC: 29 MMOL/L (ref 21–32)
CREAT SERPL-MCNC: 1.09 MG/DL (ref 0.6–1.3)
GFR SERPL CREATININE-BSD FRML MDRD: 50 ML/MIN/1.73SQ M
GLUCOSE P FAST SERPL-MCNC: 128 MG/DL (ref 65–99)
GLUCOSE SERPL-MCNC: 128 MG/DL (ref 65–140)
HCT VFR BLD AUTO: 31.5 % (ref 34.8–46.1)
HGB BLD-MCNC: 9.3 G/DL (ref 11.5–15.4)
POTASSIUM SERPL-SCNC: 4.7 MMOL/L (ref 3.5–5.3)
SODIUM SERPL-SCNC: 137 MMOL/L (ref 136–145)

## 2022-02-16 PROCEDURE — 97530 THERAPEUTIC ACTIVITIES: CPT

## 2022-02-16 PROCEDURE — 97110 THERAPEUTIC EXERCISES: CPT

## 2022-02-16 PROCEDURE — 97116 GAIT TRAINING THERAPY: CPT

## 2022-02-16 PROCEDURE — 85018 HEMOGLOBIN: CPT | Performed by: ORTHOPAEDIC SURGERY

## 2022-02-16 PROCEDURE — 99253 IP/OBS CNSLTJ NEW/EST LOW 45: CPT | Performed by: INTERNAL MEDICINE

## 2022-02-16 PROCEDURE — 85014 HEMATOCRIT: CPT | Performed by: ORTHOPAEDIC SURGERY

## 2022-02-16 PROCEDURE — 80048 BASIC METABOLIC PNL TOTAL CA: CPT | Performed by: ORTHOPAEDIC SURGERY

## 2022-02-16 RX ORDER — FERROUS SULFATE 325(65) MG
325 TABLET ORAL
Status: DISCONTINUED | OUTPATIENT
Start: 2022-02-16 | End: 2022-02-16

## 2022-02-16 RX ORDER — BACITRACIN, NEOMYCIN, POLYMYXIN B 400; 3.5; 5 [USP'U]/G; MG/G; [USP'U]/G
1 OINTMENT TOPICAL DAILY
Status: DISCONTINUED | OUTPATIENT
Start: 2022-02-16 | End: 2022-02-18 | Stop reason: HOSPADM

## 2022-02-16 RX ORDER — VENLAFAXINE HYDROCHLORIDE 150 MG/1
150 CAPSULE, EXTENDED RELEASE ORAL DAILY
Status: DISCONTINUED | OUTPATIENT
Start: 2022-02-16 | End: 2022-02-18 | Stop reason: HOSPADM

## 2022-02-16 RX ORDER — AMLODIPINE BESYLATE 5 MG/1
5 TABLET ORAL DAILY
Status: DISCONTINUED | OUTPATIENT
Start: 2022-02-16 | End: 2022-02-18 | Stop reason: HOSPADM

## 2022-02-16 RX ADMIN — DOCUSATE SODIUM 100 MG: 100 CAPSULE ORAL at 17:03

## 2022-02-16 RX ADMIN — OXYCODONE HYDROCHLORIDE AND ACETAMINOPHEN 1 TABLET: 5; 325 TABLET ORAL at 17:03

## 2022-02-16 RX ADMIN — OXYCODONE HYDROCHLORIDE AND ACETAMINOPHEN 1 TABLET: 5; 325 TABLET ORAL at 04:36

## 2022-02-16 RX ADMIN — LEVOTHYROXINE SODIUM 137 MCG: 25 TABLET ORAL at 08:59

## 2022-02-16 RX ADMIN — FONDAPARINUX SODIUM 2.5 MG: 2.5 INJECTION, SOLUTION SUBCUTANEOUS at 08:01

## 2022-02-16 RX ADMIN — DOCUSATE SODIUM 100 MG: 100 CAPSULE ORAL at 08:01

## 2022-02-16 RX ADMIN — BACITRACIN, NEOMYCIN, POLYMYXIN B 1 SMALL APPLICATION: 400; 3.5; 5 OINTMENT TOPICAL at 08:59

## 2022-02-16 RX ADMIN — OXYCODONE HYDROCHLORIDE 10 MG: 10 TABLET, FILM COATED, EXTENDED RELEASE ORAL at 20:19

## 2022-02-16 RX ADMIN — OXYCODONE HYDROCHLORIDE 10 MG: 10 TABLET, FILM COATED, EXTENDED RELEASE ORAL at 08:01

## 2022-02-16 RX ADMIN — AMLODIPINE BESYLATE 5 MG: 5 TABLET ORAL at 08:59

## 2022-02-16 RX ADMIN — ONDANSETRON 4 MG: 4 TABLET, ORALLY DISINTEGRATING ORAL at 10:50

## 2022-02-16 RX ADMIN — OXYCODONE HYDROCHLORIDE AND ACETAMINOPHEN 1 TABLET: 5; 325 TABLET ORAL at 12:59

## 2022-02-16 RX ADMIN — FERROUS SULFATE TAB 325 MG (65 MG ELEMENTAL FE) 325 MG: 325 (65 FE) TAB at 08:59

## 2022-02-16 RX ADMIN — VENLAFAXINE HYDROCHLORIDE 150 MG: 150 CAPSULE, EXTENDED RELEASE ORAL at 08:59

## 2022-02-16 RX ADMIN — OXYCODONE HYDROCHLORIDE AND ACETAMINOPHEN 1 TABLET: 5; 325 TABLET ORAL at 00:17

## 2022-02-16 RX ADMIN — Medication 1 TABLET: at 08:58

## 2022-02-16 NOTE — UTILIZATION REVIEW
Initial Clinical Review  [facility] bed on 02/15 @ 1408 converted to Inpatient on 02/16 @ [phone] for continued treatment of pt s/p TKA, right with on going pain control management and  PT assessment for safe discharge  Admission: Date/Time/Statement:   Admission Orders (From admission, onward)     Ordered        02/16/22 0753  Inpatient Admission  Once                      Orders Placed This Encounter   Procedures    Inpatient Admission     Standing Status:   Standing     Number of Occurrences:   1     Order Specific Question:   Level of Care     Answer:   Med Surg [16]     Order Specific Question:   Estimated length of stay     Answer:   More than 2 Midnights     Order Specific Question:   Certification     Answer:   I certify that inpatient services are medically necessary for this patient for a duration of greater than two midnights  See H&P and MD Progress Notes for additional information about the patient's course of treatment  Initial Presentation: 66year old female presented to [facility] for OP scheduled TKA of right knee d/t worsening pain  Pt was changed to inpatient admission on 02/16 d/t new onset of exacerbation of pain, gait instability or difficulty walking, decreased or limited mobility, ROM, weight bearing, impaired functional status and balance deficiency; risk for falling related to S/P RIGHT Total Knee Replacement  02/16   Orthopedics Notes: Will require at least two night stay to be assessed by PT for discharge to home with home care services or to a skilled nursing facility  Reinfusion Hemovac discontinued  Drain removed  Details of dressing change and positioning of knee in bed with no catching maintaining full EXT to avoid flexion contracture  PE: AAQ x 3  Incision clean, dressings dry  Dressing change today  Hemovac D/C'ed atraumatically  Distal neurovascular intact  No S/S VTED  IM Consult: S/P TKR (total knee replacement) using cement, right  Medical management    ABLA noted as evidence by 11 4 pre-op to 9 3 post-op  Would hold off on transfusion unless Hgb <7  Blood pressure acceptable   Resume home regimen    Monitor with routine vitals     Date: 02/17   Day 2:     ED Triage Vitals   Temperature Pulse Respirations Blood Pressure SpO2   02/15/22 0804 02/15/22 0804 02/15/22 0804 02/15/22 0804 02/15/22 0804   98 °F (36 7 °C) 81 16 157/70 97 %      Temp Source Heart Rate Source Patient Position - Orthostatic VS BP Location FiO2 (%)   02/15/22 0804 02/15/22 1013 02/15/22 1013 02/15/22 1013 --   Temporal Monitor Lying Right arm       Pain Score       02/15/22 0804       7          Wt Readings from Last 1 Encounters:   02/15/22 92 kg (202 lb 13 2 oz)     Additional Vital Signs:   Date/Time Temp Pulse Resp BP MAP (mmHg) SpO2 Calculated FIO2 (%) - Nasal Cannula Nasal Cannula O2 Flow Rate (L/min) O2 Device Cardiac (WDL) Patient Position - Orthostatic VS   02/16/22 1116 96 °F (35 6 °C) Abnormal  62 20 121/64 90 96 % -- -- None (Room air) -- Lying   02/16/22 0700 95 9 °F (35 5 °C) Abnormal  67 -- 124/59 -- 94 % -- -- None (Room air) -- --   02/16/22 0407 97 8 °F (36 6 °C) 77 20 140/75 -- 98 % -- -- -- -- Lying   02/15/22 2300 98 2 °F (36 8 °C) 82 20 120/81 -- 95 % -- -- None (Room air) -- Lying   02/15/22 1921 97 2 °F (36 2 °C) Abnormal  69 -- 121/68 -- 94 % -- -- None (Room air) -- Lying   02/15/22 1604 97 6 °F (36 4 °C) 72 14 125/74 93 92 % -- -- None (Room air) -- Lying   02/15/22 1531 -- 58 12 109/59 82 94 % 28 2 L/min Nasal cannula -- --   02/15/22 1515 98 1 °F (36 7 °C) 60 12 113/55 79 95 % 28 2 L/min Nasal cannula -- --   02/15/22 1504 -- 58 13 116/54 78 94 % 28 2 L/min Nasal cannula -- --   02/15/22 1451 -- 60 14 -- -- 94 % 28 2 L/min Nasal cannula -- --   02/15/22 1445 -- 62 16 102/54 75 92 % 32 3 L/min Nasal cannula -- --   02/15/22 1423 -- -- -- -- -- -- 40 5 L/min Nasal cannula -- --   02/15/22 1419 -- 60 12 -- -- 98 % 44 6 L/min Simple mask -- --   02/15/22 1418 -- 60 12 -- -- 98 % 44 6 L/min Simple mask -- --   02/15/22 1411 -- 54 Abnormal  13 105/54 77 90 % 28 2 L/min Nasal cannula -- --   02/15/22 1356 98 2 °F (36 8 °C) 64 18 110/53 76 93 % 32 3 L/min Nasal cannula WDL --   02/15/22 1043 -- 68 16 114/58 -- 99 % 32 3 L/min Nasal cannula -- Lying   02/15/22 1036 -- 66 16 117/54 -- 99 % 32 3 L/min Nasal cannula -- Lying   02/15/22 1025 -- 64 16 126/68 -- 98 % 32 3 L/min Nasal cannula -- Lying   02/15/22 1013 -- 68 16 176/119 Abnormal  -- 98 % -- -- None (Room air) -- Lying       Pertinent Labs/Diagnostic Test Results:   XR right knee: Unremarkable appearance of total knee arthroplasty      Results from last 7 days   Lab Units 02/15/22  0810   SARS-COV-2  Negative     Results from last 7 days   Lab Units 02/16/22  0421 02/15/22  1435   WBC Thousand/uL  --  9 29   HEMOGLOBIN g/dL 9 3* 11 4*   HEMATOCRIT % 31 5* 36 7   PLATELETS Thousands/uL  --  132*         Results from last 7 days   Lab Units 02/16/22  0421   SODIUM mmol/L 137   POTASSIUM mmol/L 4 7   CHLORIDE mmol/L 103   CO2 mmol/L 29   ANION GAP mmol/L 5   BUN mg/dL 18   CREATININE mg/dL 1 09   EGFR ml/min/1 73sq m 50   CALCIUM mg/dL 8 4             Results from last 7 days   Lab Units 02/16/22  0421   GLUCOSE RANDOM mg/dL 128           Results from last 7 days   Lab Units 02/15/22  0810   INFLUENZA A PCR  Negative   INFLUENZA B PCR  Negative   RSV PCR  Negative       Past Medical History:   Diagnosis Date    Anemia     Anxiety     Arthritis     right knee    Asthma     childhood    Chronic sore throat     since intubation yrs ago    Depression     Disease of thyroid gland     hypothyroid    Headache     frequently    Hx of gastric bypass     Hypertension     S/P TKR (total knee replacement) using cement, right 2/16/2022    Wears dentures     full uppers     Present on Admission:  **None**      Admitting Diagnosis: Pain in right knee [M25 561]  Unilateral primary osteoarthritis, right knee [M17 11]  Age/Sex: 66 y o  female  Admission Orders:  SCD  PT  I/O  Neurovascular checks  WBAT on RLE  Scheduled Medications:  amLODIPine, 5 mg, Oral, Daily  docusate sodium, 100 mg, Oral, BID  fondaparinux, 2 5 mg, Subcutaneous, Daily  levothyroxine, 137 mcg, Oral, Early Morning  multivitamin-minerals, 1 tablet, Oral, Daily  neomycin-bacitracin-polymyxin b, 1 small application, Topical, Daily  oxyCODONE, 10 mg, Oral, Q12H GABE  venlafaxine, 150 mg, Oral, Daily      Continuous IV Infusions:  lactated ringers infusion  Rate: 125 mL/hr Dose: 125 mL/hr  Freq: Continuous Route: IV  Indications of Use: IV Hydration  Last Dose: Stopped (02/16/22 0806)  Start: 02/15/22 0800 End: 02/16/22 0919     PRN Meds:  acetaminophen, 650 mg, Oral, Q4H PRN  HYDROmorphone, 0 2 mg, Intravenous, Q6H PRN  HYDROmorphone, 0 5 mg, Intravenous, Q2H PRN  ondansetron, 4 mg, Oral, Q6H PRN 02/16 x 1  oxyCODONE-acetaminophen, 1 tablet, Oral, Q4H PRN 02/15 x 1, 02/16 x 3        IP CONSULT TO CASE MANAGEMENT  IP CONSULT TO NUTRITION SERVICES  IP CONSULT TO INTERNAL MEDICINE    Network Utilization Review Department  ATTENTION: Please call with any questions or concerns to [phone] and carefully listen to the prompts so that you are directed to the right person  All voicemails are confidential   Candia Siemens all requests for admission clinical reviews, approved or denied determinations and any other requests to dedicated fax number below belonging to the campus where the patient is receiving treatment   List of dedicated fax numbers for the Facilities:  [address] DENIALS (Administrative/Medical Necessity) [phone]   [address] [place] [address] (Maternity/NICU/Pediatrics) 261 [facility],[address] [address]  [address] Medical [place] 2000 [facility] [place] [id number] [place] [address] Gayatri Perry 1481 [phone] [facility] [phone]

## 2022-02-16 NOTE — PHYSICAL THERAPY NOTE
PHYSICAL THERAPY NOTE          Patient Name: Natividad Maguire  FHDSW'H Date: 2/16/2022 02/16/22 0930   Note Type   Note Type Treatment   Pain Assessment   Pain Assessment Tool 0-10   Pain Score 9   Pain Location/Orientation Orientation: Right;Location: Leg  (calf)   Hospital Pain Intervention(s) Repositioned; Ambulation/increased activity; Emotional support   Restrictions/Precautions   RLE Weight Bearing Per Order WBAT   Other Precautions WBS; Fall Risk   General   Chart Reviewed Yes   Family/Caregiver Present No   Cognition   Overall Cognitive Status WFL   Arousal/Participation Alert; Responsive; Cooperative   Attention Within functional limits   Orientation Level Oriented X4   Memory Within functional limits   Following Commands Follows multistep commands with increased time or repetition   Subjective   Subjective my leg hurst in the back  pt reports R calf pain  Bed Mobility   Supine to Sit 4  Minimal assistance   Additional items Assist x 1; Increased time required;Leg ;Verbal cues;LE management   Sit to Supine 5  Supervision   Additional items Assist x 1; Increased time required;Verbal cues;LE management;Leg    Transfers   Sit to Stand 4  Minimal assistance  (mod assist x1 from lower surfaces   )   Additional items Assist x 1; Increased time required;Verbal cues; Bedrails   Stand to Sit 4  Minimal assistance   Additional items Assist x 1; Increased time required;Verbal cues   Additional Comments cues for hand placement and postioning of R le with stand to sit  Ambulation/Elevation   Gait pattern Improper Weight shift; Poor UE support; Antalgic;Decreased foot clearance;Decreased R stance; Short stride; Step to;Excessively slow; Foward flexed; Inconsistent jaycob   Gait Assistance 4  Minimal assist   Additional items Assist x 1;Verbal cues   Assistive Device Rolling walker   Distance 6' x1, 12' x1 seated rest between gait trials   Balance   Static Sitting Good   Dynamic Sitting Fair +   Static Standing Fair   Dynamic Standing Poor +   Ambulatory Poor +   Endurance Deficit   Endurance Deficit Description fatigue, pain, dizziness   Activity Tolerance   Activity Tolerance Patient limited by pain; Patient limited by fatigue  (diossiness bp 119/ 69, hr 65bpm, spo2 97% on RA)   Medical Staff Made Aware Diamond TREVINO   Exercises   TKR Supine;10 reps;AAROM;AROM; Right;Bilateral  (a/a slr, saq, a/a-a hs, a a hip abd / add ,  ap, qs gs, )   Equipment Use   Comments pt issued writne HEP reveiwed and performed wtih pt,  pt ed on use of ice, positioning of R le at rest in bed,, mobility upon return to home  Assessment   Prognosis Good   Problem List Decreased strength;Decreased range of motion;Decreased endurance; Impaired balance;Decreased mobility; Decreased safety awareness; Obesity; Decreased skin integrity;Orthopedic restrictions;Pain   Assessment Pt  Seen for PT treatment session for progress of mobility, bed mobility, transfer and gait training le there ex hep for TKR, pt education  Pt  rec'd supine in bed, reporting 9/10 R knee pain  Pt pr-medicated for pain prior to PT session  Pt  Perform supine a-aarom for TKR x 10 reps  Pt  Is requiring assistance to perform SLR, heel slides, hip abd /add exercises  Frequent rest breaks reqired due to muscle fatigue, generalized fatigue and pain  Pt  Performs supine to sit with min assist with use of sheet to lift and maneuver r le out of bed with increased time and verbal cues required with head of bed elevated  Pt  Performs sit to supine with supervision with use of leg , increased time and cues to perform and complete    Pt  Perform sit to stand from bed with min assist x1 and use of bed rail, cues for hand placement and R le positioning and placement,  Mod assist x1 to complete sit to stand from lower surfaces,  Pt progressed with ambulation to 6' x1 and 12' x1, one seated rest breaks required due to fatigue and reports of feeling dizzy  Pt's bp 119/49, spo2 97% and hr 65 bpm   Once rest pt ambulated 12' and returned back to bed  Ice applied to right knee and scd's to b/l le's  Functional limitations due to pain, impairments in strength, activity tolerance, endurance, balance, locomotion and decreased R knee ROM  Recommend continued inpt PT in order to progress mobility and maximize functional outcomes for safe d/c to home with family support and assistance and HHPT  Goals   Patient Goals to walk without pain  STG Expiration Date 02/25/22   PT Treatment Day 1   Plan   Treatment/Interventions Functional transfer training;LE strengthening/ROM; Therapeutic exercise; Endurance training;Patient/family training;Equipment eval/education; Bed mobility;Gait training;Spoke to nursing   Progress Slow progress, decreased activity tolerance   PT Frequency Twice a day   Recommendation   PT Discharge Recommendation Home with home health rehabilitation   Gayatri Means [phone]   Turning in Bed Without Bedrails 3   Lying on Back to Sitting on Edge of Flat Bed 3   Moving Bed to Chair 3   Standing Up From Chair 2   Walk in Room 3   Climb 3-5 Stairs 2   Basic Mobility Inpatient Raw Score 16   Basic Mobility Standardized Score 38 32   Highest Level Of Mobility   -[facility] Goal 5: Stand one or more mins   -[facility] Highest Level of Mobility 6: Walk 10 steps or more   Education   Education Provided Home exercise program;Mobility training   Patient Demonstrates verbal understanding;Reinforcement needed   End of Consult   Patient Position at End of Consult Supine;Bed/Chair alarm activated; All needs within reach      02/16/22 0930   Note Type   Note Type Treatment   Pain Assessment   Pain Assessment Tool 0-10   Pain Score 9   Pain Location/Orientation Orientation: Right;Location: Leg  (calf)   Hospital Pain Intervention(s) Repositioned; Ambulation/increased activity; Emotional support   Restrictions/Precautions RLE Weight Bearing Per Order WBAT   Other Precautions WBS; Fall Risk   General   Chart Reviewed Yes   Family/Caregiver Present No   Cognition   Overall Cognitive Status WFL   Arousal/Participation Alert; Responsive; Cooperative   Attention Within functional limits   Orientation Level Oriented X4   Memory Within functional limits   Following Commands Follows multistep commands with increased time or repetition   Subjective   Subjective my leg hurst in the back  pt reports R calf pain  Bed Mobility   Supine to Sit 4  Minimal assistance   Additional items Assist x 1; Increased time required;Leg ;Verbal cues;LE management   Sit to Supine 5  Supervision   Additional items Assist x 1; Increased time required;Verbal cues;LE management;Leg    Transfers   Sit to Stand 4  Minimal assistance  (mod assist x1 from lower surfaces   )   Additional items Assist x 1; Increased time required;Verbal cues; Bedrails   Stand to Sit 4  Minimal assistance   Additional items Assist x 1; Increased time required;Verbal cues   Additional Comments cues for hand placement and postioning of R le with stand to sit  Ambulation/Elevation   Gait pattern Improper Weight shift; Poor UE support; Antalgic;Decreased foot clearance;Decreased R stance; Short stride; Step to;Excessively slow; Foward flexed; Inconsistent jaycob   Gait Assistance 4  Minimal assist   Additional items Assist x 1;Verbal cues   Assistive Device Rolling walker   Distance 6' x1, 12' x1 seated rest between gait trials   Balance   Static Sitting Good   Dynamic Sitting Fair +   Static Standing Fair   Dynamic Standing Poor +   Ambulatory Poor +   Endurance Deficit   Endurance Deficit Description fatigue, pain, dizziness   Activity Tolerance   Activity Tolerance Patient limited by pain; Patient limited by fatigue  (diossiness bp 119/ 69, hr 65bpm, spo2 97% on RA)   Medical Staff Made Aware Diamond TREVINO   TKR Supine;10 reps;AAROM;AROM; Right;Bilateral  (a/a slr, saq, a/a-a hs, a a hip abd / add ,  ap, qs gs, )   Equipment Use   Comments pt issued writne HEP reveiwed and performed wtih pt,  pt ed on use of ice, positioning of R le at rest in bed,, mobility upon return to home  Assessment   Prognosis Good   Problem List Decreased strength;Decreased range of motion;Decreased endurance; Impaired balance;Decreased mobility; Decreased safety awareness; Obesity; Decreased skin integrity;Orthopedic restrictions;Pain   Assessment Pt  Seen for PT treatment session for progress of mobility, bed mobility, transfer and gait training le there ex hep for TKR, pt education  Pt  rec'd supine in bed, reporting 9/10 R knee pain  Pt pr-medicated for pain prior to PT session  Pt  Perform supine a-aarom for TKR x 10 reps  Pt  Is requiring assistance to perform SLR, heel slides, hip abd /add exercises  Frequent rest breaks reqired due to muscle fatigue, generalized fatigue and pain  Pt  Performs supine to sit with min assist with use of sheet to lift and maneuver r le out of bed with increased time and verbal cues required with head of bed elevated  Pt  Performs sit to supine with supervision with use of leg , increased time and cues to perform and complete  Pt  Perform sit to stand from bed with min assist x1 and use of bed rail, cues for hand placement and R le positioning and placement,  Mod assist x1 to complete sit to stand from lower surfaces,  Pt progressed with ambulation to 6' x1 and 12' x1, one seated rest breaks required due to fatigue and reports of feeling dizzy  Pt's bp 119/49, spo2 97% and hr 65 bpm   Once rest pt ambulated 12' and returned back to bed  Ice applied to right knee and scd's to b/l le's  Functional limitations due to pain, impairments in strength, activity tolerance, endurance, balance, locomotion and decreased R knee ROM    Recommend continued inpt PT in order to progress mobility and maximize functional outcomes for safe d/c to home with family support and assistance and HHPT  Goals   Patient Goals to walk without pain  STG Expiration Date 02/25/22   PT Treatment Day 1   Plan   Treatment/Interventions Functional transfer training;LE strengthening/ROM; Therapeutic exercise; Endurance training;Patient/family training;Equipment eval/education; Bed mobility;Gait training;Spoke to nursing   Progress Slow progress, decreased activity tolerance   PT Frequency Twice a day   Recommendation   PT Discharge Recommendation Home with home health rehabilitation   Gayatri Means [phone]   Turning in Bed Without Bedrails 3   Lying on Back to Sitting on Edge of Flat Bed 3   Moving Bed to Chair 3   Standing Up From Chair 2   Walk in Room 3   Climb 3-5 Stairs 2   Basic Mobility Inpatient Raw Score 16   Basic Mobility Standardized Score 38 32   Highest Level Of Mobility   -[facility] Goal 5: Stand one or more mins   -[facility] Highest Level of Mobility 6: Walk 10 steps or more   Education   Education Provided Home exercise program;Mobility training   Patient Demonstrates verbal understanding;Reinforcement needed   End of Consult   Patient Position at End of Consult Supine;Bed/Chair alarm activated; All needs within reach   [place], [place]

## 2022-02-16 NOTE — PLAN OF CARE
Problem: MOBILITY - ADULT  Goal: Maintain or return to baseline ADL function  Description: INTERVENTIONS:  -  Assess patient's ability to carry out ADLs; assess patient's baseline for ADL function and identify physical deficits which impact ability to perform ADLs (bathing, care of mouth/teeth, toileting, grooming, dressing, etc )  - Assess/evaluate cause of self-care deficits   - Assess range of motion  - Assess patient's mobility; develop plan if impaired  - Assess patient's need for assistive devices and provide as appropriate  - Encourage maximum independence but intervene and supervise when necessary  - Involve family in performance of ADLs  - Assess for home care needs following discharge   - Consider OT consult to assist with ADL evaluation and planning for discharge  - Provide patient education as appropriate  Outcome: Progressing  Goal: Maintains/Returns to pre admission functional level  Description: INTERVENTIONS:  - Perform BMAT or MOVE assessment daily    - Set and communicate daily mobility goal to care team and patient/family/caregiver  - Collaborate with rehabilitation services on mobility goals if consulted  - Perform Range of Motion 3 times a day  - Reposition patient every 2 hours    - Dangle patient 3 times a day  - Stand patient 3 times a day  - Ambulate patient 3 times a day  - Out of bed to chair 3 times a day   - Out of bed for meals 3 times a day  - Out of bed for toileting  - Record patient progress and toleration of activity level   Outcome: Progressing     Problem: Potential for Falls  Goal: Patient will remain free of falls  Description: INTERVENTIONS:  - Educate patient/family on patient safety including physical limitations  - Instruct patient to call for assistance with activity   - Consult OT/PT to assist with strengthening/mobility   - Keep Call bell within reach  - Keep bed low and locked with side rails adjusted as appropriate  - Keep care items and personal belongings within reach  - Initiate and maintain comfort rounds  - Make Fall Risk Sign visible to staff  - Offer Toileting every 2 Hours, in advance of need  - Initiate/Maintain alarm  - Obtain necessary fall risk management equipment:   - Apply yellow socks and bracelet for high fall risk patients  - Consider moving patient to room near nurses station  Outcome: Progressing     Problem: SAFETY ADULT  Goal: Maintain or return to baseline ADL function  Description: INTERVENTIONS:  -  Assess patient's ability to carry out ADLs; assess patient's baseline for ADL function and identify physical deficits which impact ability to perform ADLs (bathing, care of mouth/teeth, toileting, grooming, dressing, etc )  - Assess/evaluate cause of self-care deficits   - Assess range of motion  - Assess patient's mobility; develop plan if impaired  - Assess patient's need for assistive devices and provide as appropriate  - Encourage maximum independence but intervene and supervise when necessary  - Involve family in performance of ADLs  - Assess for home care needs following discharge   - Consider OT consult to assist with ADL evaluation and planning for discharge  - Provide patient education as appropriate  Outcome: Progressing     Problem: PAIN - ADULT  Goal: Verbalizes/displays adequate comfort level or baseline comfort level  Description: Interventions:  - Encourage patient to monitor pain and request assistance  - Assess pain using appropriate pain scale  - Administer analgesics based on type and severity of pain and evaluate response  - Implement non-pharmacological measures as appropriate and evaluate response  - Consider cultural and social influences on pain and pain management  - Notify physician/advanced practitioner if interventions unsuccessful or patient reports new pain  Outcome: Progressing     Problem: DISCHARGE PLANNING  Goal: Discharge to home or other facility with appropriate resources  Description: INTERVENTIONS:  - Identify barriers to discharge w/patient and caregiver  - Arrange for needed discharge resources and transportation as appropriate  - Identify discharge learning needs (meds, wound care, etc )  - Arrange for interpretive services to assist at discharge as needed  - Refer to Case Management Department for coordinating discharge planning if the patient needs post-hospital services based on physician/advanced practitioner order or complex needs related to functional status, cognitive ability, or social support system  Outcome: Progressing

## 2022-02-16 NOTE — PLAN OF CARE
Problem: PHYSICAL THERAPY ADULT  Goal: Performs mobility at highest level of function for planned discharge setting  See evaluation for individualized goals  Description: Treatment/Interventions: Functional transfer training,LE strengthening/ROM,Elevations,Therapeutic exercise,Endurance training,Patient/family training,Equipment eval/education,Bed mobility,Gait training,Compensatory technique education,Continued evaluation,Spoke to nursing,Family  Equipment Recommended: Walker,Other (Comment) (RW, BSC)       See flowsheet documentation for full assessment, interventions and recommendations  Outcome: Progressing  Note: Prognosis: Good  Problem List: Decreased strength,Decreased range of motion,Decreased endurance,Impaired balance,Decreased mobility,Decreased safety awareness,Obesity,Decreased skin integrity,Orthopedic restrictions,Pain  Assessment: Pt  Seen for PT treatment session for progress of mobility, bed mobility, transfer and gait training le there ex hep for TKR, pt education  Pt  rec'd supine in bed, reporting 9/10 R knee pain  Pt pr-medicated for pain prior to PT session  Pt  Perform supine a-aarom for TKR x 10 reps  Pt  Is requiring assistance to perform SLR, heel slides, hip abd /add exercises  Frequent rest breaks reqired due to muscle fatigue, generalized fatigue and pain  Pt  Performs supine to sit with min assist with use of sheet to lift and maneuver r le out of bed with increased time and verbal cues required with head of bed elevated  Pt  Performs sit to supine with supervision with use of leg , increased time and cues to perform and complete  Pt  Perform sit to stand from bed with min assist x1 and use of bed rail, cues for hand placement and R le positioning and placement,  Mod assist x1 to complete sit to stand from lower surfaces,  Pt progressed with ambulation to 6' x1 and 12' x1, one seated rest breaks required due to fatigue and reports of feeling dizzy    Pt's bp 119/49, spo2 97% and hr 65 bpm   Once rest pt ambulated 12' and returned back to bed  Ice applied to right knee and scd's to b/l le's  Functional limitations due to pain, impairments in strength, activity tolerance, endurance, balance, locomotion and decreased R knee ROM  Recommend continued inpt PT in order to progress mobility and maximize functional outcomes for safe d/c to home with family support and assistance and HHPT  PT Discharge Recommendation: Home with home health rehabilitation          See flowsheet documentation for full assessment

## 2022-02-16 NOTE — PLAN OF CARE
Problem: PHYSICAL THERAPY ADULT  Goal: Performs mobility at highest level of function for planned discharge setting  See evaluation for individualized goals  Description: Treatment/Interventions: Functional transfer training,LE strengthening/ROM,Elevations,Therapeutic exercise,Endurance training,Patient/family training,Equipment eval/education,Bed mobility,Gait training,Compensatory technique education,Continued evaluation,Spoke to nursing,Family  Equipment Recommended: Walker,Other (Comment) (RW, BSC)       See flowsheet documentation for full assessment, interventions and recommendations  2/16/2022 1824 by Luann Jamil PTA  Outcome: Progressing  Note: Prognosis: Good  Problem List: Decreased strength,Decreased range of motion,Decreased endurance,Impaired balance,Decreased mobility,Decreased safety awareness,Obesity,Decreased skin integrity,Orthopedic restrictions,Pain  Assessment:  Pt  Seen for PT treatment session as per PT POC for progression for mobility  Pt  Sleeping upon entering  Pt easily arouseable, pt reports 10/10 r knee pain this PM   Pt  Medicated by nursing 11/2 hours prior to PT session  Pt continues to require increased time, cues and min assist for supine to sit, supervision sit to supine, min assist for transfers sit to stand and stand to sit  , toilet transfers with min assist and ambulation with min assist x1 with use of Rw  Pt  Continues to requires verbal cues for safe and proper transfer techniques, hand placement and positioning or r le with stand to sit transfers  Cues for ease of descent onto lower surfaces, pt plopped down into chair  Pt progressed with ambulation distances to 20' x2, 3' x2 increased time to perform, gait deviations noted slow step to antalgic gait pattern,  Inconsistent jaycob and decreased foot clearance  increased effort for all mobility, requiring cues for improved quality of gait and improved step through and fluency    Pt  performs seated R le ROM exercises for Tkr, pt self assists with hip flexion, laq, pt performs toilet transfers with min assist cues for techniques and body positioning prior to sitting on to commode  Pt  Performs gina anal hygiene with assist for obtaining toilet paper  Pt returned to supine with supervision with use of leg , increased time and cues for technique  Ice applied to r knee and SCD's to b/l le's  The patient's AM-PAC Basic Mobility Inpatient Short Form Raw Score is 16  A Raw score of less than or equal to 16 suggests the patient may benefit from discharge to post-acute rehabilitation services  Please also refer to the recommendation of the Physical Therapist for safe discharge planning  Functional limitations due to pain, impairments in strength, activity tolerance, endurance, balance, locomotion and decreased R knee ROM  Recommend continued inpt PT in order to progress mobility and maximize functional outcomes for safe d/c to home with family support and assistance and HHPT  Anticipate pt to continue to progress toward PT goals for safe dc to home with HHPT and family support and assistance            PT Discharge Recommendation: Home with home health rehabilitation          See flowsheet documentation for full assessment

## 2022-02-16 NOTE — CONSULTS
Shahnaz Gonzalez 13 F [place] 1950, 66 y o  female MRN: [id number]  Unit/Bed#: E2 -01 Encounter: [id number]  Primary Care Provider: Bill Welsh MD   Date and time admitted to hospital: 2/15/2022  7:44 AM    Inpatient consult to Internal Medicine  Consult performed by: REHAN Cuevas  Consult ordered by: Nate Castellnaos MD        * S/P TKR (total knee replacement) using cement, right  Assessment & Plan  Background: Presented to [facility] for total knee replacement  Orthopedic surgery primary team  SLIM consulted for assistance with medical management  · ABLA noted as evidence by 11 4 pre-op to 9 3 post-op  · Would hold off on transfusion unless Hgb <7  · Possible element of hemodilution given OR fluids as well   · Creatinine stable   · Vitals stable   · PT/OT consulted   · DVT prophylaxis per primary team     Hypertension  Assessment & Plan  · Blood pressure acceptable   · Resume home regimen   · Monitor with routine vitals     Disease of thyroid gland  Assessment & Plan  · Resumed synthroid therapy     Asthma  Assessment & Plan  · Without acute exacerbation   · Respiratory protocol     Hx of gastric bypass  Assessment & Plan  · Ongoing OP follow up with bariatric surgery     Obesity  Assessment & Plan  · Again strongly encourage lifestyle modifications       VTE Prophylaxis: VTE Score: 12 High Risk (Score >/= 5) - Pharmacological DVT Prophylaxis Ordered: Atrixa per primary team   Sequential Compression Devices Ordered  Recommendations for Discharge:  · Per primary team      Counseling / Coordination of Care Time: 30 minutes Greater than 50% of total time spent on patient counseling and coordination of care  Collaboration of Care: Were Recommendations Directly Discussed with Primary Treatment Team? No ortho attending not available via tiger text, however feel free to reach out to [facility] for any questions or concerns       History of Present Illness:  Henok Badillo Claudette Richard is a 66 y o  female who is originally admitted to the orthopedic surgery service due to total knee replacement  We are consulted for assistance with medical management  Review of Systems:  Review of Systems   Constitutional: Negative  Respiratory: Negative  Cardiovascular: Negative  Gastrointestinal: Negative  Genitourinary: Negative  Musculoskeletal:        Complains of pain along the back of her R knee  Neurological: Negative  Past Medical and Surgical History:   Past Medical History:   Diagnosis Date    Anemia     Anxiety     Arthritis     right knee    Asthma     childhood    Chronic sore throat     since intubation yrs ago    Depression     Disease of thyroid gland     hypothyroid    Headache     frequently    Hx of gastric bypass     Hypertension     S/P TKR (total knee replacement) using cement, right 2/16/2022    Wears dentures     full uppers       Past Surgical History:   Procedure Laterality Date    APPENDECTOMY      CATARACT EXTRACTION Bilateral     CHOLECYSTECTOMY      COLECTOMY      COLONOSCOPY      GASTRIC BYPASS      HYSTERECTOMY      IA TOTAL KNEE ARTHROPLASTY Right 2/15/2022    Procedure: ARTHROPLASTY KNEE TOTAL;  Surgeon: Kemal Pineda MD;  Location: [facility];  Service: Orthopedics       Meds/Allergies:  PTA meds:   Prior to Admission Medications   Prescriptions Last Dose Informant Patient Reported? Taking?    Calcium-Vitamin D-Vitamin K (VIACTIV PO) 2/14/2022 at Unknown time  Yes Yes   Sig: Take 2 tablets by mouth in the morning   Levothyroxine Sodium 137 MCG CAPS 2/15/2022 at 0630  Yes Yes   Sig: Take by mouth Alternate with 125 mcg every other day   Multiple Vitamins-Minerals (CENTRUM SILVER PO) 2/14/2022 at Unknown time  Yes Yes   Sig: Take by mouth in the morning   acetaminophen (TYLENOL) 500 mg tablet 2/14/2022 at 2200  Yes Yes   Sig: Take 500-1,000 mg by mouth every 6 (six) hours as needed for mild pain   amLODIPine (NORVASC) 5 mg tablet 2/15/2022 at 0630  Yes Yes   Sig: Take 5 mg by mouth daily   aspirin (ECOTRIN LOW STRENGTH) 81 mg EC tablet 2/10/2022  Yes Yes   Sig: Take 81 mg by mouth daily Pt stopped   ferrous sulfate 325 (65 Fe) mg tablet 2/14/2022 at Unknown time  Yes Yes   Sig: Take 325 mg by mouth 3 (three) times a day with meals   venlafaxine (EFFEXOR-XR) 150 mg 24 hr capsule 2/15/2022 at 0630  Yes Yes   Sig: Take 150 mg by mouth daily      Facility-Administered Medications: None       Allergies: Allergies   Allergen Reactions    Lisinopril-Hydrochlorothiazide Angioedema    Metronidazole And Related Shortness Of Breath     Respiratory Distress    Penicillins Other (See Comments)     Respiratory Distress    Clindamycin Nausea Only    Latex Rash     Rash at bra line       Social History:  Marital Status: /Civil Union  Substance Use History:   Social History     Substance and Sexual Activity   Alcohol Use Not Currently     Social History     Tobacco Use   Smoking Status Never Smoker   Smokeless Tobacco Never Used     Social History     Substance and Sexual Activity   Drug Use Never       Family History:  History reviewed  No pertinent family history  Physical Exam:   Vitals:   Blood Pressure: 124/59 (02/16/22 0700)  Pulse: 67 (02/16/22 0700)  Temperature: (!) 95 9 °F (35 5 °C) (02/16/22 0700)  Temp Source: Tympanic (02/16/22 0700)  Respirations: 20 (02/16/22 0407)  Height: 5' 5" (165 1 cm) (02/15/22 1604)  Weight - Scale: 92 kg (202 lb 13 2 oz) (02/15/22 1604)  SpO2: 94 % (02/16/22 0700)    Physical Exam  Vitals and nursing note reviewed  Constitutional:       General: She is not in acute distress  Appearance: She is well-developed  She is not ill-appearing  HENT:      Head: Normocephalic and atraumatic  Eyes:      Conjunctiva/sclera: Conjunctivae normal    Cardiovascular:      Rate and Rhythm: Normal rate and regular rhythm  Heart sounds: No murmur heard        Pulmonary:      Effort: Pulmonary effort is normal  No respiratory distress  Breath sounds: Normal breath sounds  Abdominal:      General: Abdomen is flat  Bowel sounds are normal       Palpations: Abdomen is soft  Tenderness: There is no abdominal tenderness  Musculoskeletal:      Cervical back: Neck supple  Skin:     General: Skin is warm and dry  Capillary Refill: Capillary refill takes 2 to 3 seconds  Neurological:      Mental Status: She is alert and oriented to person, place, and time  Psychiatric:         Mood and Affect: Mood normal          Behavior: Behavior normal  Behavior is cooperative  Additional Data:   Lab Results:    Results from last 7 days   Lab Units 02/16/22  0421 02/15/22  1435 02/15/22  1435   WBC Thousand/uL  --   --  9 29   HEMOGLOBIN g/dL 9 3*   < > 11 4*   HEMATOCRIT % 31 5*   < > 36 7   PLATELETS Thousands/uL  --   --  132*    < > = values in this interval not displayed  Results from last 7 days   Lab Units 02/16/22  0421   SODIUM mmol/L 137   POTASSIUM mmol/L 4 7   CHLORIDE mmol/L 103   CO2 mmol/L 29   BUN mg/dL 18   CREATININE mg/dL 1 09   ANION GAP mmol/L 5   CALCIUM mg/dL 8 4   GLUCOSE RANDOM mg/dL 128             Lab Results   Component Value Date/Time    HGBA1C 6 1 (H) 01/25/2022 09:14 AM    HGBA1C 6 2 (H) 11/09/2021 08:49 AM    HGBA1C 6 1 (H) 04/19/2021 07:55 AM               Imaging: Reviewed radiology reports from this admission including: xray(s)  XR knee right 1 or 2 views   Final Result by Kailee Huang MD (02/16 3029)      Unremarkable appearance of total knee arthroplasty  Workstation performed: [id number]           EKG, Pathology, and Other Studies Reviewed on Admission:   · OP documentation     ** Please Note: This note may have been constructed using a voice recognition system   **

## 2022-02-16 NOTE — PHYSICAL THERAPY NOTE
PHYSICAL THERAPY NOTE          Patient Name: Mary Sepulveda  BNABM'A Date: 2/16/2022 02/16/22 1425   Note Type   Note Type BID visit/treatment   Pain Assessment   Pain Assessment Tool 0-10   Pain Score 10 - Worst Possible Pain   Pain Location/Orientation Orientation: Right;Location: Knee   Hospital Pain Intervention(s) Repositioned;Cold applied; Ambulation/increased activity; Emotional support   Restrictions/Precautions   RLE Weight Bearing Per Order WBAT   Other Precautions WBS; Fall Risk;Pain   General   Chart Reviewed Yes   Family/Caregiver Present No   Cognition   Overall Cognitive Status WFL   Subjective   Subjective i had someting for pain around 1 or 2  Bed Mobility   Supine to Sit 4  Minimal assistance   Additional items Assist x 1;HOB elevated; Bedrails; Increased time required;Leg ;Verbal cues;LE management  (use of sheet to lift and manuever R le out of bed  )   Sit to Supine 5  Supervision   Additional items Assist x 1; Increased time required;Verbal cues;LE management;Leg    Additional Comments use of sheet to lift R le in and out of  bed  increased time to perform    Transfers   Sit to Stand 4  Minimal assistance   Additional items Assist x 1;Bedrails; Increased time required;Verbal cues   Stand to Sit 4  Minimal assistance   Additional items Assist x 1; Armrests; Increased time required;Verbal cues  (cues fo ease of descent, pt plopped back into chair   )   Stand pivot 4  Minimal assistance   Additional items Assist x 1; Increased time required;Verbal cues   Toilet transfer 4  Minimal assistance   Additional items Assist x 1; Armrests; Increased time required;Verbal cues; Commode   Additional Comments cues for hand placement and positioning or R le prior to sitting  Ambulation/Elevation   Gait pattern Improper Weight shift; Poor UE support; Antalgic;Decreased foot clearance;Decreased R stance; Short stride; Step to;Excessively slow; Inconsistent jaycob   Gait Assistance 4  Minimal assist  (to cg- close supervision  )   Additional items Assist x 1;Verbal cues   Assistive Device Rolling walker   Distance 20' x2 one standing rest break, 3' x2   Balance   Static Sitting Good   Dynamic Sitting Fair +   Static Standing Fair -   Dynamic Standing Poor +   Ambulatory Fair -   Endurance Deficit   Endurance Deficit Yes   Endurance Deficit Description fatigue, pain  Activity Tolerance   Activity Tolerance Patient limited by pain; Patient limited by fatigue   Medical Staff Made Inez Cast, RN   Nurse Made Aware yes   Exercises   Heelslides Sitting;10 reps;AROM; Right   Glute Sets Sitting;10 reps;AROM; Bilateral   Hip Flexion Sitting;10 reps;AAROM; Right   Hip Adduction Sitting;10 reps;Bilateral  (isometric hip add   )   Knee AROM Long Arc Quad Sitting;10 reps;AROM; Right   Ankle Pumps Sitting;10 reps;AROM; Right   Assessment   Prognosis Good   Problem List Decreased strength;Decreased range of motion;Decreased endurance; Impaired balance;Decreased mobility; Decreased safety awareness; Obesity; Decreased skin integrity;Orthopedic restrictions;Pain   Assessment  Pt  Seen for PT treatment session as per PT POC for progression for mobility  Pt  Sleeping upon entering  Pt easily arouseable, pt reports 10/10 r knee pain this PM   Pt  Medicated by nursing 11/2 hours prior to PT session  Pt continues to require increased time, cues and min assist for supine to sit, supervision sit to supine, min assist for transfers sit to stand and stand to sit  , toilet transfers with min assist and ambulation with min assist x1 with use of Rw  Pt  Continues to requires verbal cues for safe and proper transfer techniques, hand placement and positioning or r le with stand to sit transfers  Cues for ease of descent onto lower surfaces, pt plopped down into chair    Pt progressed with ambulation distances to 20' x2, 3' x2 increased time to perform, gait deviations noted slow step to antalgic gait pattern,  Inconsistent jaycob and decreased foot clearance  increased effort for all mobility, requiring cues for improved quality of gait and improved step through and fluency  Pt  performs seated R le ROM exercises for Tkr, pt self assists with hip flexion, laq, pt performs toilet transfers with min assist cues for techniques and body positioning prior to sitting on to commode  Pt  Performs gina anal hygiene with assist for obtaining toilet paper  Pt returned to supine with supervision with use of leg , increased time and cues for technique  Ice applied to r knee and SCD's to b/l le's  The patient's AM-PAC Basic Mobility Inpatient Short Form Raw Score is 16  A Raw score of less than or equal to 16 suggests the patient may benefit from discharge to post-acute rehabilitation services  Please also refer to the recommendation of the Physical Therapist for safe discharge planning  Functional limitations due to pain, impairments in strength, activity tolerance, endurance, balance, locomotion and decreased R knee ROM   Recommend continued inpt PT in order to progress mobility and maximize functional outcomes for safe d/c to home with family support and assistance and HHPT  Anticipate pt to continue to progress toward PT goals for safe dc to home with HHPT and family support and assistance    Goals   Patient Goals to walk without pain  STG Expiration Date 02/25/22   PT Treatment Day 2   Plan   Treatment/Interventions Functional transfer training;LE strengthening/ROM; Therapeutic exercise; Endurance training;Patient/family training;Equipment eval/education; Bed mobility;Gait training;Spoke to nursing   Progress Slow progress, decreased activity tolerance  (pain)   PT Frequency Twice a day   Recommendation   PT Discharge Recommendation Home with home health rehabilitation   [place] 8 in Bed Without Bedrails 3   Lying on Back to Sitting on Edge of Flat Bed 3   Moving Bed to Chair 3   Standing Up From Chair 2   Walk in Room 3   Climb 3-5 Stairs 2   Basic Mobility Inpatient Raw Score 16   Basic Mobility Standardized Score 38 32   Highest Level Of Mobility   -[facility] Goal 5: Stand one or more mins   -[facility] Highest Level of Mobility 6: Walk 10 steps or more   -[facility] Goal Achieved Yes   End of Consult   Patient Position at End of Consult Supine; All needs within reach   End of Consult Comments ice to r knee scd's to b/l le's      Mary Rees, PTA

## 2022-02-16 NOTE — PROGRESS NOTES
Vital signs stable  H/H stable  Temp 95 9  Incision clean, dressings dry  Dressing change today  Hemovac D/C'ed atraumatically  Distal neurovascular intact  No S/S VTED  Physical Therapy consulted and started yesterday  Case Management consulted  Reinfusion Hemovac discontinued  Drain removed  Patient alert and oriented X 3  Details of dressing change and positioning of knee in bed with no catching maintaining full EXT to avoid flexion contracture  Tiffany Palafox admission status changed to inpatient status due new onset or exacerbation of pain S/P RIGHT Total Knee Replacement; gait instability or difficulty walking related to S/P RIGHT Total Knee Replacement; decreased or limited mobility, ROM, weight bearing, impaired functional status and balance deficiency; risk for falling related to S/P RIGHT Total Knee Replacement  Tiffany Palafox will require at least two night stay to be assessed by PT for discharge to home with home care services or to a skilled nursing facility

## 2022-02-16 NOTE — ASSESSMENT & PLAN NOTE
Background: Presented to [facility] for total knee replacement  Orthopedic surgery primary team  SLIM consulted for assistance with medical management     · ABLA noted as evidence by 11 4 pre-op to 9 3 post-op  · Would hold off on transfusion unless Hgb <7  · Possible element of hemodilution given OR fluids as well   · Creatinine stable   · Vitals stable   · PT/OT consulted   · DVT prophylaxis per primary team

## 2022-02-16 NOTE — CASE MANAGEMENT
Case Management Discharge Planning Note    Patient name Nidhi Kilpatrick  Location [facility] [address] Rodriguez-* MRN [id number]  : 1950 Date 2022       Current Admission Date: 2/15/2022  Current Admission Diagnosis:S/P TKR (total knee replacement) using cement, right   Patient Active Problem List    Diagnosis Date Noted    S/P TKR (total knee replacement) using cement, right 2022    Obesity 02/15/2022    Asthma     Hypertension     Disease of thyroid gland     Hx of gastric bypass       LOS (days): 0  Geometric Mean LOS (GMLOS) (days): 1 80  Days to GMLOS:1 6     OBJECTIVE:  Risk of Unplanned Readmission Score: 8      Current admission status: Inpatient   [facility]   [facility] [address] [facility],  [facility] ,  [address] [facility] [address]   Phone: [phone] Fax: [phone]    Primary Care Provider: Keya Amaro MD    Primary Insurance: [address] [place] [address],[address] REP  Secondary Insurance:     DISCHARGE DETAILS:    [facility]         Is the patient interested in [address] at discharge?: Yes  [address] requested[de-identified] Physical [address] Name[de-identified] [facility] Provider[de-identified] PCP  Home Health Services Needed[de-identified] Gait/ADL Training,Heart Failure [facility] and Assessment,Strengthening/Theraputic Exercises to Improve Function  Homebound Criteria Met[de-identified] Uses an Assist Device (i e  cane, walker, etc),Requires the Assistance of Another Person for Safe Ambulation or to Leave the Home  Supporting Clincal Findings[de-identified] Limited Endurance    DME Referral Provided  Referral made for DME?: Yes  DME referral completed for the following items[de-identified] Atif Tubbs  DME Supplier Name[de-identified] [employer]    [facility]  Discharge Destination Plan[de-identified] Home with [facility] at Discharge : Family

## 2022-02-17 PROBLEM — D62 ACUTE BLOOD LOSS ANEMIA: Status: ACTIVE | Noted: 2022-02-17

## 2022-02-17 LAB
ANION GAP SERPL CALCULATED.3IONS-SCNC: 9 MMOL/L (ref 4–13)
BASOPHILS # BLD AUTO: 0.04 THOUSANDS/ΜL (ref 0–0.1)
BASOPHILS NFR BLD AUTO: 1 % (ref 0–1)
BUN SERPL-MCNC: 14 MG/DL (ref 5–25)
CALCIUM SERPL-MCNC: 8.2 MG/DL (ref 8.3–10.1)
CHLORIDE SERPL-SCNC: 103 MMOL/L (ref 100–108)
CO2 SERPL-SCNC: 28 MMOL/L (ref 21–32)
CREAT SERPL-MCNC: 0.81 MG/DL (ref 0.6–1.3)
DME PARACHUTE DELIVERY DATE ACTUAL: NORMAL
DME PARACHUTE DELIVERY DATE REQUESTED: NORMAL
DME PARACHUTE ITEM DESCRIPTION: NORMAL
DME PARACHUTE ITEM DESCRIPTION: NORMAL
DME PARACHUTE ORDER STATUS: NORMAL
DME PARACHUTE SUPPLIER NAME: NORMAL
DME PARACHUTE SUPPLIER PHONE: NORMAL
EOSINOPHIL # BLD AUTO: 0.08 THOUSAND/ΜL (ref 0–0.61)
EOSINOPHIL NFR BLD AUTO: 1 % (ref 0–6)
ERYTHROCYTE [DISTWIDTH] IN BLOOD BY AUTOMATED COUNT: 19.9 % (ref 11.6–15.1)
GFR SERPL CREATININE-BSD FRML MDRD: 72 ML/MIN/1.73SQ M
GLUCOSE SERPL-MCNC: 125 MG/DL (ref 65–140)
GLUCOSE SERPL-MCNC: 127 MG/DL (ref 65–140)
HCT VFR BLD AUTO: 29.5 % (ref 34.8–46.1)
HGB BLD-MCNC: 8.7 G/DL (ref 11.5–15.4)
IMM GRANULOCYTES # BLD AUTO: 0.03 THOUSAND/UL (ref 0–0.2)
IMM GRANULOCYTES NFR BLD AUTO: 1 % (ref 0–2)
LYMPHOCYTES # BLD AUTO: 0.67 THOUSANDS/ΜL (ref 0.6–4.47)
LYMPHOCYTES NFR BLD AUTO: 12 % (ref 14–44)
MCH RBC QN AUTO: 26.1 PG (ref 26.8–34.3)
MCHC RBC AUTO-ENTMCNC: 29.5 G/DL (ref 31.4–37.4)
MCV RBC AUTO: 89 FL (ref 82–98)
MONOCYTES # BLD AUTO: 0.59 THOUSAND/ΜL (ref 0.17–1.22)
MONOCYTES NFR BLD AUTO: 10 % (ref 4–12)
NEUTROPHILS # BLD AUTO: 4.34 THOUSANDS/ΜL (ref 1.85–7.62)
NEUTS SEG NFR BLD AUTO: 75 % (ref 43–75)
NRBC BLD AUTO-RTO: 0 /100 WBCS
PLATELET # BLD AUTO: 135 THOUSANDS/UL (ref 149–390)
POTASSIUM SERPL-SCNC: 4.2 MMOL/L (ref 3.5–5.3)
RBC # BLD AUTO: 3.33 MILLION/UL (ref 3.81–5.12)
SODIUM SERPL-SCNC: 140 MMOL/L (ref 136–145)
WBC # BLD AUTO: 5.75 THOUSAND/UL (ref 4.31–10.16)

## 2022-02-17 PROCEDURE — 97110 THERAPEUTIC EXERCISES: CPT

## 2022-02-17 PROCEDURE — 85025 COMPLETE CBC W/AUTO DIFF WBC: CPT | Performed by: INTERNAL MEDICINE

## 2022-02-17 PROCEDURE — 97530 THERAPEUTIC ACTIVITIES: CPT

## 2022-02-17 PROCEDURE — 99232 SBSQ HOSP IP/OBS MODERATE 35: CPT | Performed by: INTERNAL MEDICINE

## 2022-02-17 PROCEDURE — 80048 BASIC METABOLIC PNL TOTAL CA: CPT | Performed by: ORTHOPAEDIC SURGERY

## 2022-02-17 PROCEDURE — 82948 REAGENT STRIP/BLOOD GLUCOSE: CPT

## 2022-02-17 PROCEDURE — 97116 GAIT TRAINING THERAPY: CPT

## 2022-02-17 RX ORDER — OXYCODONE HYDROCHLORIDE AND ACETAMINOPHEN 5; 325 MG/1; MG/1
1 TABLET ORAL EVERY 4 HOURS PRN
Qty: 45 TABLET | Refills: 0
Start: 2022-02-17 | End: 2022-02-25

## 2022-02-17 RX ORDER — ASPIRIN 81 MG/1
81 TABLET ORAL DAILY
Refills: 0
Start: 2022-03-09 | End: 2023-03-09

## 2022-02-17 RX ORDER — FONDAPARINUX SODIUM 2.5 MG/.5ML
2.5 INJECTION SUBCUTANEOUS DAILY
Refills: 0
Start: 2022-02-18 | End: 2022-03-09

## 2022-02-17 RX ORDER — ONDANSETRON 4 MG/1
4 TABLET, ORALLY DISINTEGRATING ORAL EVERY 6 HOURS PRN
Qty: 10 TABLET | Refills: 0
Start: 2022-02-17

## 2022-02-17 RX ADMIN — AMLODIPINE BESYLATE 5 MG: 5 TABLET ORAL at 08:33

## 2022-02-17 RX ADMIN — VENLAFAXINE HYDROCHLORIDE 150 MG: 150 CAPSULE, EXTENDED RELEASE ORAL at 08:33

## 2022-02-17 RX ADMIN — OXYCODONE HYDROCHLORIDE AND ACETAMINOPHEN 1 TABLET: 5; 325 TABLET ORAL at 13:17

## 2022-02-17 RX ADMIN — OXYCODONE HYDROCHLORIDE 10 MG: 10 TABLET, FILM COATED, EXTENDED RELEASE ORAL at 08:33

## 2022-02-17 RX ADMIN — FONDAPARINUX SODIUM 2.5 MG: 2.5 INJECTION, SOLUTION SUBCUTANEOUS at 08:34

## 2022-02-17 RX ADMIN — OXYCODONE HYDROCHLORIDE AND ACETAMINOPHEN 1 TABLET: 5; 325 TABLET ORAL at 03:22

## 2022-02-17 RX ADMIN — OXYCODONE HYDROCHLORIDE 10 MG: 10 TABLET, FILM COATED, EXTENDED RELEASE ORAL at 20:17

## 2022-02-17 RX ADMIN — BACITRACIN, NEOMYCIN, POLYMYXIN B 1 SMALL APPLICATION: 400; 3.5; 5 OINTMENT TOPICAL at 08:33

## 2022-02-17 RX ADMIN — DOCUSATE SODIUM 100 MG: 100 CAPSULE ORAL at 08:33

## 2022-02-17 RX ADMIN — LEVOTHYROXINE SODIUM 137 MCG: 25 TABLET ORAL at 05:57

## 2022-02-17 RX ADMIN — Medication 1 TABLET: at 08:33

## 2022-02-17 RX ADMIN — DOCUSATE SODIUM 100 MG: 100 CAPSULE ORAL at 17:05

## 2022-02-17 NOTE — PROGRESS NOTES
[facility]  Progress Note - Lurdes Peterson 1950, 66 y o  female MRN: [id number]  Unit/Bed#: E2 -01 Encounter: [id number]  Primary Care Provider: Nayely Goldstein MD   Date and time admitted to hospital: 2/15/2022  7:44 AM    * S/P TKR (total knee replacement) using cement, right  Assessment & Plan  Presented to [facility] for total knee replacement  Continue physical therapy, analgesics, and DVT prophylaxis   Patient prescribed Arixtra per Orthopedic surgery    Acute blood loss anemia  Assessment & Plan  -patient developed acute blood loss anemia, likely secondary to expected procedural blood loss  -baseline hemoglobin appears to be 11  -hemoglobin has decreased to 8 7  -initially patient was symptomatic, yesterday and noted to be lightheaded when ambulating with physical therapy  -IV Venofer was initially going to be prescribed, however patient notes that she is very sensitive to medications: Had previous angioedema from an ACE-inhibitor, and prior allergic reactions to oral iron supplements  -therefore iron supplements were not prescribed:  Discussed with patient high iron containing foods  -no evidence of hypotension, asymptomatic anemia    Hx of gastric bypass  Assessment & Plan  · Patient has a history of gastric bypass  · Ongoing OP follow up with bariatric surgery   · Avoid all NSAIDs  Patient continues on her bariatric vitamins as an outpatient    Disease of thyroid gland  Assessment & Plan  · Continue Synthroid    Hypertension  Assessment & Plan  · Patient's history of essential hypertension  · Continue Norvasc 5 mg daily  · Blood pressure adequately controlled    Continue follow-up with PCP    Asthma  Assessment & Plan  · Patient has history of asthma, without acute exacerbation   · Respiratory protocol         VTE Pharmacologic Prophylaxis: Fondaparinux (Arixtra)  VTE Mechanical Prophylaxis: sequential compression device        Status: inpatient ===================================================================    Subjective:  Patient notes she feels better  She notes she continues to have pain in her knee postoperatively  She denies any pain anywhere else  Denies any chest pain  Denies any shortness of breath or dyspnea on exertion  Denies any cough  Denies any abdominal pain, nausea, vomiting, diarrhea  She is eating prunes to treat constipation  She notes she ambulated with physical therapy today  Denies any dizziness or lightheadedness  Denies any weakness, or fatigue      Physical Exam:   Temp:  [97 1 °F (36 2 °C)-98 1 °F (36 7 °C)] 98 1 °F (36 7 °C)  HR:  [71-80] 80  Resp:  [16-18] 16  BP: (126-135)/(51-63) 129/51    Gen:  Pleasant, non-tachypnic, non-dyspnic  Conversant  Heart: regular rate and rhythm, S1S2 present, no murmur, rub or gallop  Lungs: clear to ausculatation bilaterally  No wheezing, crackles, or rhonchi  No accessory muscle use or respiratory distress  Abd: soft, non-tender, non-distended  NABS, no guarding, rebound or peritoneal signs  Extremities: no clubbing, cyanosis or edema  2+pedal pulses bilaterally  Neuro: awake, alert  Fluent speech  Interactive      LABS:   Results from last 7 days   Lab Units 02/17/22 0441 02/16/22 0421 02/15/22  1435   WBC Thousand/uL 5 75  --  9 29   HEMOGLOBIN g/dL 8 7* 9 3* 11 4*   HEMATOCRIT % 29 5* 31 5* 36 7   PLATELETS Thousands/uL 135*  --  132*     Results from last 7 days   Lab Units 02/17/22 0441 02/16/22  0421   POTASSIUM mmol/L 4 2 4 7   CHLORIDE mmol/L 103 103   CO2 mmol/L 28 29   BUN mg/dL 14 18   CREATININE mg/dL 0 81 1 09   CALCIUM mg/dL 8 2* 8 4       Hospital Data:    2/15 x-ray right knee:  Unremarkable appearance of total knee arthroplasty      ---------------------------------------------------------------------------------------------------------------  This note has been constructed using a voice recognition system

## 2022-02-17 NOTE — ASSESSMENT & PLAN NOTE
· Patient has a history of gastric bypass  · Ongoing OP follow up with bariatric surgery   · Avoid all NSAIDs    Patient continues on her bariatric vitamins as an outpatient

## 2022-02-17 NOTE — ASSESSMENT & PLAN NOTE
· Patient's history of essential hypertension  · Continue Norvasc 5 mg daily  · Blood pressure adequately controlled    Continue follow-up with PCP

## 2022-02-17 NOTE — PLAN OF CARE
Problem: PHYSICAL THERAPY ADULT  Goal: Performs mobility at highest level of function for planned discharge setting  See evaluation for individualized goals  Description: Treatment/Interventions: Functional transfer training,LE strengthening/ROM,Elevations,Therapeutic exercise,Endurance training,Patient/family training,Equipment eval/education,Bed mobility,Gait training,Compensatory technique education,Continued evaluation,Spoke to nursing,Family  Equipment Recommended: Walker,Other (Comment) (RW, BSC)       See flowsheet documentation for full assessment, interventions and recommendations  2/17/2022 1844 by Luann Jamil PTA  Outcome: Progressing  Note: Prognosis: Good  Problem List: Decreased strength,Decreased range of motion,Decreased endurance,Impaired balance,Decreased mobility,Obesity,Decreased skin integrity,Orthopedic restrictions,Pain  Assessment: Pt rec'd seated at EOB upon arrival   Pt reports 3/10 pain at rest, 5/10 pain with mobility  Pt showing good progress toward PT goals with progression of mobility  Pt ambulating 80' x2 with supervision with chair follow due to fatigue and pain  Slow antalgic gait with step to progress to step through gait pattern with cues, to push walker farther ahead in order to avoid for lunge or posterior lob  Pt  Progressed with to performing stair training  Pt  Performs one step back wards with use of rw with min assist x1 progressing to close supervision x1 with cues for safe techniques and proper le sequencing  Pt  Required frequent brief standing rest beaks due to fatigue and r knee pain  Pt performs sit to supine with supervision assist with cues and latia of sheet lift R le into bed  Increased time to perform required  Pt performs a-aarom to r le x 12 reps, pt unable to I'ly slr, perform saq, and assistance for hs and hip abd /add exercises required  Limited R knee flexion arom due to pain  Pt encouraged ROM exercises to R knee    Pt's , Kayla Danyelle instructed in proper hand place when assisting pt with HEP, car transfers reviewed home management techniques ,use of ice and mobility upon return to  Home, positioning of r le when sleeping and resting in bed with pt and pt 's   Pt and pt 's  report good understanding  Recommend continued inpt PT in order to progress mobility and maximize functional outcomes for safe d/c to home with family support and assistance and HHPT  Ice applied to R knee post PT session  PT Discharge Recommendation: Home with home health rehabilitation          See flowsheet documentation for full assessment

## 2022-02-17 NOTE — PROGRESS NOTES
Vital signs stable  H/H stable 8 7 with stable VS and no subjective complaints today at 0600  Temp 98 1  Incision clean, dressings dry  Dressing change today  Distal neurovascular intact  No S/S VTED  Subcu injection inservice completed for post op anticoagulation  Physical Therapy started  Case Management to evaluate for home care or rehab  Xavi Glass preferred D/C to home this morning  Patient alert and oriented X 3

## 2022-02-17 NOTE — PHYSICAL THERAPY NOTE
PHYSICAL THERAPY NOTE          Patient Name: Aquilino Burton  UVZJT'Y Date: 2/17/2022 02/17/22 1517   Note Type   Note Type Treatment   Pain Assessment   Pain Assessment Tool 0-10   Pain Score 3  (5 during PT)   Pain Location/Orientation Orientation: Right;Location: Knee   Hospital Pain Intervention(s) Repositioned;Cold applied; Ambulation/increased activity; Emotional support; Rest   Restrictions/Precautions   RLE Weight Bearing Per Order WBAT   Other Precautions WBS;Pain; Fall Risk   General   Chart Reviewed Yes   Family/Caregiver Present Yes   Cognition   Overall Cognitive Status WFL   Subjective   Subjective I am feeling better  Bed Mobility   Sit to Supine 5  Supervision   Additional items Assist x 1; Increased time required;LE management;Leg    Transfers   Sit to Stand 5  Supervision   Additional items Assist x 1; Increased time required; Bedrails   Stand to Sit 5  Supervision   Additional items Assist x 1;Verbal cues; Bedrails   Additional Comments cues for positining of R le prior to sitting   Ambulation/Elevation   Gait pattern Forward Flexion; Antalgic; Short stride; Excessively slow; Inconsistent jaycob;Decreased R stance; Foward flexed   Gait Assistance 5  Supervision   Additional items Assist x 1;Verbal cues   Assistive Device Rolling walker   Distance 80' x2   Stair Management Assistance 4  Minimal assist   Additional items Assist x 1;Verbal cues; Increased time required  (min progressing to close supervision)   Stair Management Technique Backward; With walker   Number of Stairs   (1steps x2)   Balance   Static Sitting Good   Dynamic Sitting Fair +   Static Standing Fair +   Dynamic Standing Fair   Ambulatory Fair +   Endurance Deficit   Endurance Deficit Description fatigue, pain   Activity Tolerance   Activity Tolerance Patient tolerated treatment well;Patient limited by fatigue;Patient limited by pain   Medical Staff Made Aware Bharati Stockton, RN   Nurse Made Aware yes   Exercises   TKR Supine;AAROM;AROM; Right  (ap, qs, a/a hs )   Equipment Use   Comments pt instructed in and performed stair trianing using backward technique as pt has 1+1 EMERSON with use of rw  attempted to call [facility] for Dr Makeda De La Garza however rec'd voicemail  DID not leave a message, spoke with Severa Bidding pt 's Rn regarding progress this Pm, she reports speaking with Rasta who stated he put the d/c in for tomorrow  Assessment   Prognosis Good   Problem List Decreased strength;Decreased range of motion;Decreased endurance; Impaired balance;Decreased mobility;Obesity; Decreased skin integrity;Orthopedic restrictions;Pain   Assessment Pt rec'd seated at EOB upon arrival   Pt reports 3/10 pain at rest, 5/10 pain with mobility  Pt showing good progress toward PT goals with progression of mobility  Pt ambulating 80' x2 with supervision with chair follow due to fatigue and pain  Slow antalgic gait with step to progress to step through gait pattern with cues, to push walker farther ahead in order to avoid for lunge or posterior lob  Pt  Progressed with to performing stair training  Pt  Performs one step back wards with use of rw with min assist x1 progressing to close supervision x1 with cues for safe techniques and proper le sequencing  Pt  Required frequent brief standing rest beaks due to fatigue and r knee pain  Pt performs sit to supine with supervision assist with cues and latia of sheet lift R le into bed  Increased time to perform required  Pt performs a-aarom to r le x 12 reps, pt unable to I'ly slr, perform saq, and assistance for hs and hip abd /add exercises required  Limited R knee flexion arom due to pain  Pt encouraged ROM exercises to R knee    Pt's , Martinez Coker instructed in proper hand place when assisting pt with HEP, car transfers reviewed home management techniques ,use of ice and mobility upon return to  Home, positioning of r le when sleeping and resting in bed with pt and pt 's   Pt and pt 's  report good understanding  Recommend continued inpt PT in order to progress mobility and maximize functional outcomes for safe d/c to home with family support and assistance and HHPT  Ice applied to R knee post PT session  Goals   Patient Goals to go home tomorrow  STG Expiration Date 02/25/22   PT Treatment Day 3   Plan   Treatment/Interventions Functional transfer training;LE strengthening/ROM; Elevations; Therapeutic exercise; Endurance training;Patient/family training;Equipment eval/education; Bed mobility;Gait training;Spoke to nursing   Progress Progressing toward goals   PT Frequency Twice a day   Recommendation   PT Discharge Recommendation Home with home health rehabilitation   Gayatri Means [phone]   Turning in Bed Without Bedrails 4   Lying on Back to Sitting on Edge of Flat Bed 3   Moving Bed to Chair 4   Standing Up From Chair 3   Walk in Room 4   Climb 3-5 Stairs 3   Basic Mobility Inpatient Raw Score 21   Basic Mobility Standardized Score 45 55   Highest Level Of Mobility   JH-HLM Goal 6: Walk 10 steps or more   JH-HLM Highest Level of Mobility 7: Walk 25 feet or more   JH-HLM Goal Achieved Yes   Education   Education Provided Home exercise program;Mobility training; Other  (car transfers, family training with HEP, )   End of Consult   Patient Position at End of Consult Supine; All needs within reach   End of Consult Comments ice to R knee and scd's to b/l le's         02/17/22 6283   Note Type   Note Type Treatment   Pain Assessment   Pain Assessment Tool 0-10   Pain Score 3  (5 during PT)   Pain Location/Orientation Orientation: Right;Location: Knee   Hospital Pain Intervention(s) Repositioned;Cold applied; Ambulation/increased activity; Emotional support; Rest   Restrictions/Precautions   RLE Weight Bearing Per Order WBAT   Other Precautions WBS;Pain; Fall Risk   General   Chart Reviewed Yes   Family/Caregiver Present Yes Cognition   Overall Cognitive Status WFL   Subjective   Subjective I am feeling better  Bed Mobility   Sit to Supine 5  Supervision   Additional items Assist x 1; Increased time required;LE management;Leg    Transfers   Sit to Stand 5  Supervision   Additional items Assist x 1; Increased time required; Bedrails   Stand to Sit 5  Supervision   Additional items Assist x 1;Verbal cues; Bedrails   Additional Comments cues for positining of R le prior to sitting   Ambulation/Elevation   Gait pattern Forward Flexion; Antalgic; Short stride; Excessively slow; Inconsistent jaycob;Decreased R stance; Foward flexed   Gait Assistance 5  Supervision   Additional items Assist x 1;Verbal cues   Assistive Device Rolling walker   Distance 80' x2   Stair Management Assistance 4  Minimal assist   Additional items Assist x 1;Verbal cues; Increased time required  (min progressing to close supervision)   Stair Management Technique Backward; With walker   Number of Stairs   (1steps x2)   Balance   Static Sitting Good   Dynamic Sitting Fair +   Static Standing Fair +   Dynamic Standing Fair   Ambulatory Fair +   Endurance Deficit   Endurance Deficit Description fatigue, pain   Activity Tolerance   Activity Tolerance Patient tolerated treatment well;Patient limited by fatigue;Patient limited by pain   Medical Staff Made Santa Bran, RN   Nurse Made Aware yes   Exercises   TKR Supine;AAROM;AROM; Right  (ap, qs, a/a hs )   Equipment Use   Comments pt instructed in and performed stair trianing using backward technique as pt has 1+1 EMERSON with use of rw  attempted to call [facility] for Dr Camacho Manriquez however rec'd voicemail  DID not leave a message, spoke with Chelo Lopez pt 's Rn regarding progress this Pm, she reports speaking with Rasta who stated he put the d/c in for tomorrow  Assessment   Prognosis Good   Problem List Decreased strength;Decreased range of motion;Decreased endurance; Impaired balance;Decreased mobility;Obesity; Decreased skin integrity;Orthopedic restrictions;Pain   Assessment Pt rec'd seated at EOB upon arrival   Pt reports 3/10 pain at rest, 5/10 pain with mobility  Pt showing good progress toward PT goals with progression of mobility  Pt ambulating 80' x2 with supervision with chair follow due to fatigue and pain  Slow antalgic gait with step to progress to step through gait pattern with cues, to push walker farther ahead in order to avoid for lunge or posterior lob  Pt  Progressed with to performing stair training  Pt  Performs one step back wards with use of rw with min assist x1 progressing to close supervision x1 with cues for safe techniques and proper le sequencing  Pt  Required frequent brief standing rest beaks due to fatigue and r knee pain  Pt performs sit to supine with supervision assist with cues and latia of sheet lift R le into bed  Increased time to perform required  Pt performs a-aarom to r le x 12 reps, pt unable to I'ly slr, perform saq, and assistance for hs and hip abd /add exercises required  Limited R knee flexion arom due to pain  Pt encouraged ROM exercises to R knee  Pt's , Mahogany Buchanan instructed in proper hand place when assisting pt with HEP, car transfers reviewed home management techniques ,use of ice and mobility upon return to  Home, positioning of r le when sleeping and resting in bed with pt and pt 's   Pt and pt 's  report good understanding  Recommend continued inpt PT in order to progress mobility and maximize functional outcomes for safe d/c to home with family support and assistance and HHPT  Ice applied to R knee post PT session  Goals   Patient Goals to go home tomorrow  STG Expiration Date 02/25/22   PT Treatment Day 3   Plan   Treatment/Interventions Functional transfer training;LE strengthening/ROM; Elevations; Therapeutic exercise; Endurance training;Patient/family training;Equipment eval/education; Bed mobility;Gait training;Spoke to nursing   Progress Progressing toward goals   PT Frequency Twice a day   Recommendation   PT Discharge Recommendation Home with home health rehabilitation   Gayatri Means [phone]   Turning in Bed Without Bedrails 4   Lying on Back to Sitting on Edge of Flat Bed 3   Moving Bed to Chair 4   Standing Up From Chair 3   Walk in Room 4   Climb 3-5 Stairs 3   Basic Mobility Inpatient Raw Score 21   Basic Mobility Standardized Score 45 55   Highest Level Of Mobility   JH-HLM Goal 6: Walk 10 steps or more   JH-HLM Highest Level of Mobility 7: Walk 25 feet or more   JH-HLM Goal Achieved Yes   Education   Education Provided Home exercise program;Mobility training; Other  (car transfers, family training with HEP, )   End of Consult   Patient Position at End of Consult Supine; All needs within reach   End of Consult Comments ice to R knee and scd's to b/l le's       Elizabet Gibson, PTA

## 2022-02-17 NOTE — PHYSICAL THERAPY NOTE
PHYSICAL THERAPY NOTE          Patient Name: Melissa Garcia  VPGDE'P Date: 2/17/2022 02/17/22 0957   Note Type   Note Type Treatment   Pain Assessment   Pain Assessment Tool 0-10   Pain Score 8   Pain Location/Orientation Orientation: Right;Location: Knee   Hospital Pain Intervention(s) Cold applied;Repositioned; Ambulation/increased activity; Emotional support   Restrictions/Precautions   RLE Weight Bearing Per Order WBAT   Other Precautions WBS;Pain; Fall Risk   General   Chart Reviewed Yes   Family/Caregiver Present No   Cognition   Overall Cognitive Status WFL   Arousal/Participation Alert; Responsive; Cooperative   Attention Within functional limits   Orientation Level Oriented X4   Memory Within functional limits   Following Commands Follows multistep commands with increased time or repetition   Subjective   Subjective "I'm tired "   Bed Mobility   Supine to Sit 5  Supervision   Additional items Assist x 1;HOB elevated; Bedrails; Increased time required;Verbal cues; Leg    Transfers   Sit to Stand 5  Supervision   Additional items Assist x 1;Bedrails; Increased time required;Verbal cues   Stand to Sit 5  Supervision   Additional items Assist x 1; Armrests; Increased time required;Verbal cues   Stand pivot 5  Supervision   Additional items Assist x 1; Increased time required;Verbal cues   Ambulation/Elevation   Gait pattern Improper Weight shift; Poor UE support; Antalgic; Forward Flexion;Decreased R stance; Step to;Short stride; Excessively slow   Gait Assistance 5  Supervision   Additional items Assist x 1;Verbal cues  (close supervision   )   Assistive Device Rolling walker   Distance 40' x2   Balance   Static Sitting Good   Dynamic Sitting Fair +   Static Standing Fair   Dynamic Standing Fair   Ambulatory Fair   Endurance Deficit   Endurance Deficit Description fatigue, pain      Activity Tolerance   Activity Tolerance Patient limited by pain; Patient limited by fatigue   Medical Staff Made Remigio Cali RN   Exercises   TKR Supine;AROM; Bilateral;Right;AAROM  (x 12 reps  a/a hs, a/a saq, a/a hip abd /add , ap, qs, )   Assessment   Prognosis Good   Problem List Decreased strength;Decreased range of motion;Decreased endurance; Impaired balance;Decreased mobility;Orthopedic restrictions;Pain;Decreased skin integrity;Obesity   Assessment Pt  Seen for Pt treatment session  Pt  Rec'd supine in bed  Pt  Reporting 8/10 pain, despite pt being medicated with pain med prior to PT session  Pt  Performs a-aarom to R le , assistance required to perform slr, hs, saq, hip abd /add exercises due to increased pain , decreased strength an ROM  Pt continues to required increased time to perform and complete all aspects of mobility  Pt required head of bed elevated and use of sheet to lift R le out of bed  Pt is requiring less assistance for transfers and ambulation progressing to close supervision x1  Pt progressed with ambulation distances to 40' x2 , demonstrating gait deviations on slow antalgic step to gait pattern with decreased stride, cues for improved quality of gait  And proper ue weightbearing techniques required  R knee arom progressing slow limited by pain, pt  Resistive to increased  ROM of R knee  Pt  Remained seated out of bed in chair, ice to R knee scd's to b/l le's  Pt  Continues to require increased to perform and complete all aspects of mobility  The patient's AM-PAC Basic Mobility Inpatient Short Form Raw Score is 18  A Raw score of greater than 16 suggests the patient may benefit from discharge to home  Please also refer to the recommendation of the Physical Therapist for safe discharge planning  Recommend continued inpt PT in order to progress mobility and maximize functional outcomes for safe d/c to home with family support and assistance and HHPT  Will progress to stair training in PM PT session       Goals   Patient Goals to walk without pain  STG Expiration Date 02/25/22   PT Treatment Day 2   Plan   Treatment/Interventions Functional transfer training;LE strengthening/ROM   PT Frequency Twice a day   Recommendation   PT Discharge Recommendation Home with home health rehabilitation   AM-PAC Basic Mobility Inpatient   Turning in Bed Without Bedrails 3   Lying on Back to Sitting on Edge of Flat Bed 3   Moving Bed to Chair 4   Standing Up From Chair 3   Walk in Room 3   Climb 3-5 Stairs 2   Basic Mobility Inpatient Raw Score 18   Basic Mobility Standardized Score 41 05   Highest Level Of Mobility   JH-HLM Goal 6: Walk 10 steps or more   JH-HLM Highest Level of Mobility 7: Walk 25 feet or more   JH-HLM Goal Achieved Yes   End of Consult   Patient Position at End of Consult Bedside chair; All needs within reach   End of Consult Comments ice to R knee and scd's to b/lle's       Mary Rees, PTA

## 2022-02-17 NOTE — PLAN OF CARE
Problem: MOBILITY - ADULT  Goal: Maintain or return to baseline ADL function  Description: INTERVENTIONS:  -  Assess patient's ability to carry out ADLs; assess patient's baseline for ADL function and identify physical deficits which impact ability to perform ADLs (bathing, care of mouth/teeth, toileting, grooming, dressing, etc )  - Assess/evaluate cause of self-care deficits   - Assess range of motion  - Assess patient's mobility; develop plan if impaired  - Assess patient's need for assistive devices and provide as appropriate  - Encourage maximum independence but intervene and supervise when necessary  - Involve family in performance of ADLs  - Assess for home care needs following discharge   - Consider OT consult to assist with ADL evaluation and planning for discharge  - Provide patient education as appropriate  Outcome: Progressing  Goal: Maintains/Returns to pre admission functional level  Description: INTERVENTIONS:  - Perform BMAT or MOVE assessment daily    - Set and communicate daily mobility goal to care team and patient/family/caregiver  - Collaborate with rehabilitation services on mobility goals if consulted  - Perform Range of Motion 3 times a day  - Reposition patient every 2 hours    - Dangle patient 3 times a day  - Stand patient 3 times a day  - Ambulate patient 3 times a day  - Out of bed to chair 3 times a day   - Out of bed for meals 3 times a day  - Out of bed for toileting  - Record patient progress and toleration of activity level   Outcome: Progressing     Problem: Potential for Falls  Goal: Patient will remain free of falls  Description: INTERVENTIONS:  - Educate patient/family on patient safety including physical limitations  - Instruct patient to call for assistance with activity   - Consult OT/PT to assist with strengthening/mobility   - Keep Call bell within reach  - Keep bed low and locked with side rails adjusted as appropriate  - Keep care items and personal belongings within reach  - Initiate and maintain comfort rounds  - Make Fall Risk Sign visible to staff  - Offer Toileting every 2 Hours, in advance of need  - Initiate/Maintain alarm  - Obtain necessary fall risk management equipment:   - Apply yellow socks and bracelet for high fall risk patients  - Consider moving patient to room near nurses station  Outcome: Progressing     Problem: PAIN - ADULT  Goal: Verbalizes/displays adequate comfort level or baseline comfort level  Description: Interventions:  - Encourage patient to monitor pain and request assistance  - Assess pain using appropriate pain scale  - Administer analgesics based on type and severity of pain and evaluate response  - Implement non-pharmacological measures as appropriate and evaluate response  - Consider cultural and social influences on pain and pain management  - Notify physician/advanced practitioner if interventions unsuccessful or patient reports new pain  Outcome: Progressing     Problem: SAFETY ADULT  Goal: Maintain or return to baseline ADL function  Description: INTERVENTIONS:  -  Assess patient's ability to carry out ADLs; assess patient's baseline for ADL function and identify physical deficits which impact ability to perform ADLs (bathing, care of mouth/teeth, toileting, grooming, dressing, etc )  - Assess/evaluate cause of self-care deficits   - Assess range of motion  - Assess patient's mobility; develop plan if impaired  - Assess patient's need for assistive devices and provide as appropriate  - Encourage maximum independence but intervene and supervise when necessary  - Involve family in performance of ADLs  - Assess for home care needs following discharge   - Consider OT consult to assist with ADL evaluation and planning for discharge  - Provide patient education as appropriate  Outcome: Progressing  Goal: Maintains/Returns to pre admission functional level  Description: INTERVENTIONS:  - Perform BMAT or MOVE assessment daily    - Set and communicate daily mobility goal to care team and patient/family/caregiver  - Collaborate with rehabilitation services on mobility goals if consulted  - Perform Range of Motion 3 times a day  - Reposition patient every 2 hours    - Dangle patient 3 times a day  - Stand patient 3 times a day  - Ambulate patient 3 times a day  - Out of bed to chair 3 times a day   - Out of bed for meals 3 times a day  - Out of bed for toileting  - Record patient progress and toleration of activity level   Outcome: Progressing  Goal: Patient will remain free of falls  Description: INTERVENTIONS:  - Educate patient/family on patient safety including physical limitations  - Instruct patient to call for assistance with activity   - Consult OT/PT to assist with strengthening/mobility   - Keep Call bell within reach  - Keep bed low and locked with side rails adjusted as appropriate  - Keep care items and personal belongings within reach  - Initiate and maintain comfort rounds  - Make Fall Risk Sign visible to staff  - Offer Toileting every  Hours, in advance of need  - Initiate/Maintain alarm  - Obtain necessary fall risk management equipment:   - Apply yellow socks and bracelet for high fall risk patients  - Consider moving patient to room near nurses station  Outcome: Progressing     Problem: DISCHARGE PLANNING  Goal: Discharge to home or other facility with appropriate resources  Description: INTERVENTIONS:  - Identify barriers to discharge w/patient and caregiver  - Arrange for needed discharge resources and transportation as appropriate  - Identify discharge learning needs (meds, wound care, etc )  - Arrange for interpretive services to assist at discharge as needed  - Refer to Case Management Department for coordinating discharge planning if the patient needs post-hospital services based on physician/advanced practitioner order or complex needs related to functional status, cognitive ability, or social support system  Outcome: Progressing

## 2022-02-17 NOTE — ASSESSMENT & PLAN NOTE
Presented to Ad for total knee replacement     Continue physical therapy, analgesics, and DVT prophylaxis   Patient prescribed Arixtra per Orthopedic surgery

## 2022-02-17 NOTE — ASSESSMENT & PLAN NOTE
-patient developed acute blood loss anemia, likely secondary to expected procedural blood loss  -baseline hemoglobin appears to be 11  -hemoglobin has decreased to 8 7  -initially patient was symptomatic, yesterday and noted to be lightheaded when ambulating with physical therapy  -IV Venofer was initially going to be prescribed, however patient notes that she is very sensitive to medications:   Had previous angioedema from an ACE-inhibitor, and prior allergic reactions to oral iron supplements  -therefore iron supplements were not prescribed:  Discussed with patient high iron containing foods  -no evidence of hypotension, asymptomatic anemia

## 2022-02-17 NOTE — PLAN OF CARE
Problem: PHYSICAL THERAPY ADULT  Goal: Performs mobility at highest level of function for planned discharge setting  See evaluation for individualized goals  Description: Treatment/Interventions: Functional transfer training,LE strengthening/ROM,Elevations,Therapeutic exercise,Endurance training,Patient/family training,Equipment eval/education,Bed mobility,Gait training,Compensatory technique education,Continued evaluation,Spoke to nursing,Family  Equipment Recommended: Walker,Other (Comment) (RW, BSC)       See flowsheet documentation for full assessment, interventions and recommendations  Outcome: Progressing  Note: Prognosis: Good  Problem List: Decreased strength,Decreased range of motion,Decreased endurance,Impaired balance,Decreased mobility,Obesity,Decreased skin integrity,Orthopedic restrictions,Pain  Assessment: Pt  Seen for Pt treatment session  Pt  Rec'd supine in bed  Pt  Reporting 8/10 pain, despite pt being medicated with pain med prior to PT session  Pt  Performs a-aarom to R le , assistance required to perform slr, hs, saq, hip abd /add exercises due to increased pain , decreased strength an ROM  Pt continues to required increased time to perform and complete all aspects of mobility  Pt required head of bed elevated and use of sheet to lift R le out of bed  Pt is requiring less assistance for transfers and ambulation progressing to close supervision x1  Pt progressed with ambulation distances to 40' x2 , demonstrating gait deviations on slow antalgic step to gait pattern with decreased stride, cues for improved quality of gait  And proper ue weightbearing techniques required  R knee arom progressing slow limited by pain, pt  Resistive to increased  ROM of R knee  Pt  Remained seated out of bed in chair, ice to R knee scd's to b/l le's  Pt  Continues to require increased to perform and complete all aspects of mobility    The patient's AM-PAC Basic Mobility Inpatient Short Form Raw Score is 18  A Raw score of greater than 16 suggests the patient may benefit from discharge to home  Please also refer to the recommendation of the Physical Therapist for safe discharge planning  Recommend continued inpt PT in order to progress mobility and maximize functional outcomes for safe d/c to home with family support and assistance and HHPT  Will progress to stair training in PM PT session  PT Discharge Recommendation: Home with home health rehabilitation          See flowsheet documentation for full assessment

## 2022-02-18 VITALS
OXYGEN SATURATION: 97 % | BODY MASS INDEX: 33.79 KG/M2 | TEMPERATURE: 98.1 F | DIASTOLIC BLOOD PRESSURE: 70 MMHG | HEIGHT: 65 IN | SYSTOLIC BLOOD PRESSURE: 134 MMHG | RESPIRATION RATE: 20 BRPM | WEIGHT: 202.82 LBS | HEART RATE: 84 BPM

## 2022-02-18 LAB
ANION GAP SERPL CALCULATED.3IONS-SCNC: 9 MMOL/L (ref 4–13)
BUN SERPL-MCNC: 14 MG/DL (ref 5–25)
CALCIUM SERPL-MCNC: 8.8 MG/DL (ref 8.3–10.1)
CHLORIDE SERPL-SCNC: 103 MMOL/L (ref 100–108)
CO2 SERPL-SCNC: 27 MMOL/L (ref 21–32)
CREAT SERPL-MCNC: 0.72 MG/DL (ref 0.6–1.3)
GFR SERPL CREATININE-BSD FRML MDRD: 83 ML/MIN/1.73SQ M
GLUCOSE SERPL-MCNC: 102 MG/DL (ref 65–140)
HCT VFR BLD AUTO: 29.7 % (ref 34.8–46.1)
HGB BLD-MCNC: 9 G/DL (ref 11.5–15.4)
POTASSIUM SERPL-SCNC: 4.2 MMOL/L (ref 3.5–5.3)
SODIUM SERPL-SCNC: 139 MMOL/L (ref 136–145)

## 2022-02-18 PROCEDURE — 80048 BASIC METABOLIC PNL TOTAL CA: CPT | Performed by: ORTHOPAEDIC SURGERY

## 2022-02-18 PROCEDURE — 99232 SBSQ HOSP IP/OBS MODERATE 35: CPT | Performed by: INTERNAL MEDICINE

## 2022-02-18 PROCEDURE — 85014 HEMATOCRIT: CPT | Performed by: ORTHOPAEDIC SURGERY

## 2022-02-18 PROCEDURE — 85018 HEMOGLOBIN: CPT | Performed by: ORTHOPAEDIC SURGERY

## 2022-02-18 RX ADMIN — DOCUSATE SODIUM 100 MG: 100 CAPSULE ORAL at 09:21

## 2022-02-18 RX ADMIN — VENLAFAXINE HYDROCHLORIDE 150 MG: 150 CAPSULE, EXTENDED RELEASE ORAL at 09:21

## 2022-02-18 RX ADMIN — Medication 1 TABLET: at 09:21

## 2022-02-18 RX ADMIN — BACITRACIN, NEOMYCIN, POLYMYXIN B 1 SMALL APPLICATION: 400; 3.5; 5 OINTMENT TOPICAL at 09:22

## 2022-02-18 RX ADMIN — AMLODIPINE BESYLATE 5 MG: 5 TABLET ORAL at 09:21

## 2022-02-18 RX ADMIN — FONDAPARINUX SODIUM 2.5 MG: 2.5 INJECTION, SOLUTION SUBCUTANEOUS at 09:24

## 2022-02-18 RX ADMIN — LEVOTHYROXINE SODIUM 137 MCG: 25 TABLET ORAL at 05:13

## 2022-02-18 RX ADMIN — OXYCODONE HYDROCHLORIDE AND ACETAMINOPHEN 1 TABLET: 5; 325 TABLET ORAL at 05:13

## 2022-02-18 RX ADMIN — OXYCODONE HYDROCHLORIDE 10 MG: 10 TABLET, FILM COATED, EXTENDED RELEASE ORAL at 09:21

## 2022-02-18 NOTE — PROGRESS NOTES
Progress Note - Adra Grief 66 y o  female MRN: [id number]    Unit/Bed#: E2 -01 Encounter: [id number]    Assessment/Plan:    R TKR     continue postop care by Orthopedics     acute blood loss anemia  hemoglobin stable 9 0     hypothyroid    continue Synthroid 137 micrograms daily     hypertension    controlled with Norvasc     asthma    no acute exacerbation     Subjective:    continued right knee pain,  Denies chest pain shortness of breath  nonausea vomiting no fevers chills  Appetite is good      Objective:     Vitals: Blood pressure 134/70, pulse 84, temperature 98 1 °F (36 7 °C), temperature source Tympanic, resp  rate 20, height 5' 5" (1 651 m), weight 92 kg (202 lb 13 2 oz), SpO2 97 %, not currently breastfeeding  ,Body mass index is 33 75 kg/m²  Results from last 7 days   Lab Units 02/18/22  0837 02/17/22  0441 02/17/22  0441   WBC Thousand/uL  --   --  5 75   HEMOGLOBIN g/dL 9 0*   < > 8 7*   HEMATOCRIT % 29 7*   < > 29 5*   PLATELETS Thousands/uL  --   --  135*    < > = values in this interval not displayed       Results from last 7 days   Lab Units 02/18/22  0428   POTASSIUM mmol/L 4 2   CHLORIDE mmol/L 103   CO2 mmol/L 27   BUN mg/dL 14   CREATININE mg/dL 0 72   CALCIUM mg/dL 8 8       Scheduled Meds:  Current Facility-Administered Medications   Medication Dose Route Frequency Provider Last Rate    acetaminophen  650 mg Oral Q4H PRN Gerber Garcia MD      amLODIPine  5 mg Oral Daily Gerber Garcia MD      docusate sodium  100 mg Oral BID Gerber Garcia MD      fondaparinux  2 5 mg Subcutaneous Daily Gerber Garcia MD      HYDROmorphone  0 2 mg Intravenous Q6H PRN Gerber Garcia MD      levothyroxine  137 mcg Oral Early Morning Gerber Garcia MD      multivitamin-minerals  1 tablet Oral Daily Gerber Garcia MD      neomycin-bacitracin-polymyxin b  1 small application Topical Daily Gerber Garcia MD      ondansetron  4 mg Oral Q6H PRN Gerber Garcia MD      oxyCODONE  10 mg Oral Q12H [address] Gerber Garcia MD     [facility] oxyCODONE-acetaminophen  1 tablet Oral Q4H PRN Alannah Fry MD      venlafaxine  150 mg Oral Daily Alannah Fry MD         Continuous Infusions:         Physical exam:  General appearance:  Alert no distress interaction appropriate  Head/Eyes:  nonicteric PERRL EOMI pale  Neck:  supple  Lungs: CTA bilateral no wheezing rhonchi or rales  Heart: normal S1 S2 regular  Abdomen: Soft nontender with bowel sounds  Extremities: no edema  Skin: no rash    Invasive Devices  Report    Peripheral Intravenous Line            Peripheral IV 02/15/22 Dorsal (posterior); Left Hand 3 days          Drain            Autologus Reinfusion/Drain Device 1 Right Knee 2 days    Closed/Suction Drain Anterior;Right Knee Accordion 2 days                  Counseling / Coordination of Care  Total floor / unit time spent today 30  minutes  Greater than 50% of total time was spent with the patient and / or family counseling and / or coordination of care    A description of the counseling / coordination of care:  Medically stable

## 2022-02-18 NOTE — DISCHARGE SUMMARY
DATE OF ADMISSION: 2/15/2022    DATE OF DISCHARGE: 2022      PREOPERATIVE DIAGNOSIS: Degenerative joint disease, painful right knee with  failed conservative treatment        POSTOPERATIVE DIAGNOSIS: Degenerative joint disease, painful right knee with  failed conservative treatment        In-hospital stay consisted of a right total knee arthroplasty  The patient tolerated the procedure well  There were no complications  On discharge, patient was afebrile  Patient's incision was clean  Patient was alert and oriented  No signs of DVT  Patient was on aspirin prophylaxis for DVT as recommended by the [employer]  Verbal and written instructions  were provided and appropriate prescriptions        Patient is to return to my office in 6 weeks or sooner p r n       Face to Face Encounter    Please evaluate Liya Fleming, : 1950 for admission to [address] [place] [address]  Disciplines requested: Nursing and Physical Therapy    Physician to follow patient's care (the person listed here will be responsible for signing ongoing orders): Referring Provider    Requested Start of Care Date: Within 48 hours of discharge  Special Instructions:       I attest that I or another qualified licensed provider saw Liya Fleming 90 days prior to or 30 days post admission and this face to face encounter meets the necessary Home Health requirements  The face to face encounter occurred on 22      The encounter with the patient was in whole, or in part, for the following medical condition, which is the primary reason for home health care:   Principal Problem:    S/P TKR (total knee replacement) using cement, right  Active Problems:    Asthma    Hypertension    Disease of thyroid gland    Hx of gastric bypass    Obesity    Acute blood loss anemia       I certify that, based on my clinical findings, the services are medically necessary skilled home health services: Post-Op Care and Assessment, Strengthening/Theraputic Exercises to Improve Function, Restore Joint Function Post Joint Replacement and Evaluate Functional Status and Safety  Further, I certify that my clinical findings support this patient's homebound status (i e  absences from home require considerable and taxing effort, are for health treatment, or for attendance at [employer] events; absences from home for nonmedical reasons are infrequent or are of relatively short duration)  Homebound criteria are met because: Uses an Assist Device (i e  cane, walker, etc) and Requires the Assistance of Another Person for Safe Ambulation or to Leave the Home and Limited Endurance and Fatigues [place] in [employer]  This patient is under my care, and I have initiated the establishment of the plan of care  This patient will be followed by a physician who will periodically review the plan of care

## 2022-02-18 NOTE — UTILIZATION REVIEW
Initial Clinical Review  [facility] bed on 02/15 @ 1408 converted to Inpatient on 02/16 @ [phone] for continued treatment of pt s/p TKA, right with on going pain control management and  PT assessment for safe discharge      Admission: Date/Time/Statement:       Admission Orders (From admission, onward)              Ordered          02/16/22 0753   Inpatient Admission  Once                                Orders Placed This Encounter   Procedures    Inpatient Admission       Standing Status:   Standing       Number of Occurrences:   1       Order Specific Question:   Level of Care       Answer:   Med Surg [16]       Order Specific Question:   Estimated length of stay       Answer:   More than 2 Midnights       Order Specific Question:   Certification       Answer:   I certify that inpatient services are medically necessary for this patient for a duration of greater than two midnights  See H&P and MD Progress Notes for additional information about the patient's course of treatment     Initial Presentation: 66year old female presented to [facility] for OP scheduled TKA of right knee d/t worsening pain  Pt was changed to inpatient admission on 02/16 d/t new onset of exacerbation of pain, gait instability or difficulty walking, decreased or limited mobility, ROM, weight bearing, impaired functional status and balance deficiency; risk for falling related to S/P RIGHT Total Knee Replacement       02/16   Orthopedics Notes: Will require at least two night stay to be assessed by PT for discharge to home with home care services or to a skilled nursing facility  Reinfusion Hemovac discontinued  Drain removed  Details of dressing change and positioning of knee in bed with no catching maintaining full EXT to avoid flexion contracture  PE: AAQ x 3  Incision clean, dressings dry  Dressing change today  Hemovac D/C'ed atraumatically  Distal neurovascular intact   No S/S VTED      IM Consult: S/P TKR (total knee replacement) using cement, right  Medical management  ABLA noted as evidence by 11 4 pre-op to 9 3 post-op  Would hold off on transfusion unless Hgb <7  Blood pressure acceptable   Resume home regimen   Monitor with routine vitals      Date: 02/17   Day 2: POD   #2  Continue post op care  Continue  PT/OT  Monitor labs, hemoglobin   Stable at  8 7  Afebrile  Plan was to receive  IV  Venofer, patient declined  Continue pain control as needed          ED Triage Vitals   Temperature Pulse Respirations Blood Pressure SpO2   02/15/22 0804 02/15/22 0804 02/15/22 0804 02/15/22 0804 02/15/22 0804   98 °F (36 7 °C) 81 16 157/70 97 %       Temp Source Heart Rate Source Patient Position - Orthostatic VS BP Location FiO2 (%)   02/15/22 0804 02/15/22 1013 02/15/22 1013 02/15/22 1013 --   Temporal Monitor Lying Right arm         Pain Score           02/15/22 0804           7                  Wt Readings from Last 1 Encounters:   02/15/22 92 kg (202 lb 13 2 oz     Date/Time Temp Pulse Resp BP MAP (mmHg) SpO2 Calculated FIO2 (%) - Nasal Cannula Nasal Cannula O2 Flow Rate (L/min) O2 Device Cardiac (WDL) Patient Position - Orthostatic VS   02/16/22 1116 96 °F (35 6 °C) Abnormal  62 20 121/64 90 96 % -- -- None (Room air) -- Lying   02/16/22 0700 95 9 °F (35 5 °C) Abnormal  67 -- 124/59 -- 94 % -- -- None (Room air) -- --   02/16/22 0407 97 8 °F (36 6 °C) 77 20 140/75 -- 98 % -- -- -- -- Lying   02/15/22 2300 98 2 °F (36 8 °C) 82 20 120/81 -- 95 % -- -- None (Room air) -- Lying   02/15/22 1921 97 2 °F (36 2 °C) Abnormal  69 -- 121/68 -- 94 % -- -- None (Room air) -- Lying   02/15/22 1604 97 6 °F (36 4 °C) 72 14 125/74 93 92 % -- -- None (Room air) -- Lying   02/15/22 1531 -- 58 12 109/59 82 94 % 28 2 L/min Nasal cannula -- --   02/15/22 1515 98 1 °F (36 7 °C) 60 12 113/55 79 95 % 28 2 L/min Nasal cannula       02/15/22 1445 -- 62 16 102/54 75 92 % 32 3 L/min Nasal cannula -- --   02/15/22 1423 -- -- -- -- -- -- 40 5 L/min Nasal cannula -- -- 02/15/22 1419 -- 60 12 -- -- 98 % 44 6 L/min Simple mask -- --   02/15/22 1418 -- 60 12 -- -- 98 % 44 6 L/min Simple mask -- --   02/15/22 1411 -- 54 Abnormal  13 105/54 77 90 % 28 2 L/min Nasal cannula -- --   02/15/22 1356 98 2 °F (36 8 °C) 64 18 110/53 76 93 % 32 3 L/min Nasal cannula WDL --   02/15/22 1043 -- 68 16 114/58 -- 99 % 32 3 L/min Nasal cannula -- Lying   02/15/22 1036 -- 66 16 117/54 -- 99 % 32 3 L/min Nasal cannula -- Lying   02/15/22 1025 -- 64 16 126/68 -- 98 % 32 3 L/min Nasal cannula     Pertinent Labs/Diagnostic Test Results:   XR right knee: Unremarkable appearance of total knee arthroplasty           Results from last 7 days   Lab Units 02/15/22  0810   SARS-COV-2   Negative            Results from last 7 days   Lab Units 02/16/22  0421 02/15/22  1435   WBC Thousand/uL  --  9 29   HEMOGLOBIN g/dL 9 3* 11 4*   HEMATOCRIT % 31 5* 36 7   PLATELETS Thousands/uL  --  132*           Results from last 7 days   Lab Units 02/16/22  0421   SODIUM mmol/L 137   POTASSIUM mmol/L 4 7   CHLORIDE mmol/L 103   CO2 mmol/L 29   ANION GAP mmol/L 5   BUN mg/dL 18   CREATININE mg/dL 1 09   EGFR ml/min/1 73sq m 50   CALCIUM mg/dL 8 4                   Results from last 7 days   Lab Units 02/16/22  0421   GLUCOSE RANDOM mg/dL 128      Results from last 7 days   Lab Units 02/15/22  0810   INFLUENZA A PCR   Negative   INFLUENZA B PCR   Negative   RSV PCR   Negative      Admitting Diagnosis: Pain in right knee [M25 561]  Unilateral primary osteoarthritis, right knee [M17 11]  Age/Sex: 66 y o  female  Admission Orders:  SCD  PT  I/O  Neurovascular checks  WBAT on RLE  Scheduled Medications:  amLODIPine, 5 mg, Oral, Daily  docusate sodium, 100 mg, Oral, BID  fondaparinux, 2 5 mg, Subcutaneous, Daily  levothyroxine, 137 mcg, Oral, Early Morning  multivitamin-minerals, 1 tablet, Oral, Daily  neomycin-bacitracin-polymyxin b, 1 small application, Topical, Daily  oxyCODONE, 10 mg, Oral, Q12H GABE  venlafaxine, 150 mg, Oral, Daily     Continuous IV Infusions:  lactated ringers infusion  Rate: 125 mL/hr Dose: 125 mL/hr - d/c  2/16    PRN Meds:  acetaminophen, 650 mg, Oral, Q4H PRN  HYDROmorphone, 0 2 mg, Intravenous, Q6H PRN  HYDROmorphone, 0 5 mg, Intravenous, Q2H PRN  ( x1  2/17)    ondansetron, 4 mg, Oral, Q6H PRN 02/16 x 1  oxyCODONE-acetaminophen, 1 tablet, Oral, Q4H PRN 02/15 x 1, 02/16 x 3           IP CONSULT TO CASE MANAGEMENT  IP CONSULT TO NUTRITION SERVICES  IP CONSULT TO INTERNAL MEDICINE  Network Utilization Review Department  ATTENTION: Please call with any questions or concerns to [phone] and carefully listen to the prompts so that you are directed to the right person  All voicemails are confidential   Cassidy Gonzalez all requests for admission clinical reviews, approved or denied determinations and any other requests to dedicated fax number below belonging to the campus where the patient is receiving treatment   List of dedicated fax numbers for the Facilities:  [address] DENIALS (Administrative/Medical Necessity) [phone]   [address] (Maternity/NICU/Pediatrics) [phone] [address]  [phone]   Emma Yang 50 [address] [place] [address] [address] [place] [address] Gayatri Perry 1481 [address] [facility] [address] [facility] [phone]

## 2022-02-18 NOTE — PROGRESS NOTES
Vital signs stable  H/H stable @ 9 0 Temp 98 Incision clean, dressings dry  Dressing change today  Distal neurovascular intact  No S/S VTED  Physical Therapy consulted  Case Management consulted  Patient alert and oriented X 3  Plan is D/C to home today with full instructions discussed this morning  Arixtra SQ for 3 weeks followed by ASA 81 MG for 3 weeks  Printed instructions provided  Importance of maintaining full EXT discussed again  Follow up in my office scheduled  Rehab on scheduled without complications

## 2022-02-18 NOTE — PLAN OF CARE
Problem: MOBILITY - ADULT  Goal: Maintain or return to baseline ADL function  Description: INTERVENTIONS:  -  Assess patient's ability to carry out ADLs; assess patient's baseline for ADL function and identify physical deficits which impact ability to perform ADLs (bathing, care of mouth/teeth, toileting, grooming, dressing, etc )  - Assess/evaluate cause of self-care deficits   - Assess range of motion  - Assess patient's mobility; develop plan if impaired  - Assess patient's need for assistive devices and provide as appropriate  - Encourage maximum independence but intervene and supervise when necessary  - Involve family in performance of ADLs  - Assess for home care needs following discharge   - Consider OT consult to assist with ADL evaluation and planning for discharge  - Provide patient education as appropriate  Outcome: Progressing  Goal: Maintains/Returns to pre admission functional level  Description: INTERVENTIONS:  - Perform BMAT or MOVE assessment daily    - Set and communicate daily mobility goal to care team and patient/family/caregiver  - Collaborate with rehabilitation services on mobility goals if consulted  - Perform Range of Motion 3 times a day  - Reposition patient every 2 hours    - Dangle patient 3 times a day  - Stand patient 3 times a day  - Ambulate patient 3 times a day  - Out of bed to chair 3 times a day   - Out of bed for meals 3 times a day  - Out of bed for toileting  - Record patient progress and toleration of activity level   Outcome: Progressing     Problem: Potential for Falls  Goal: Patient will remain free of falls  Description: INTERVENTIONS:  - Educate patient/family on patient safety including physical limitations  - Instruct patient to call for assistance with activity   - Consult OT/PT to assist with strengthening/mobility   - Keep Call bell within reach  - Keep bed low and locked with side rails adjusted as appropriate  - Keep care items and personal belongings within reach  - Initiate and maintain comfort rounds  - Make Fall Risk Sign visible to staff  - Offer Toileting every 2 Hours, in advance of need  - Initiate/Maintain bed alarm  - Obtain necessary fall risk management equipment: yellow braceleet  - Apply yellow socks and bracelet for high fall risk patients  - Consider moving patient to room near nurses station  Outcome: Progressing     Problem: PAIN - ADULT  Goal: Verbalizes/displays adequate comfort level or baseline comfort level  Description: Interventions:  - Encourage patient to monitor pain and request assistance  - Assess pain using appropriate pain scale  - Administer analgesics based on type and severity of pain and evaluate response  - Implement non-pharmacological measures as appropriate and evaluate response  - Consider cultural and social influences on pain and pain management  - Notify physician/advanced practitioner if interventions unsuccessful or patient reports new pain  Outcome: Progressing     Problem: SAFETY ADULT  Goal: Maintain or return to baseline ADL function  Description: INTERVENTIONS:  -  Assess patient's ability to carry out ADLs; assess patient's baseline for ADL function and identify physical deficits which impact ability to perform ADLs (bathing, care of mouth/teeth, toileting, grooming, dressing, etc )  - Assess/evaluate cause of self-care deficits   - Assess range of motion  - Assess patient's mobility; develop plan if impaired  - Assess patient's need for assistive devices and provide as appropriate  - Encourage maximum independence but intervene and supervise when necessary  - Involve family in performance of ADLs  - Assess for home care needs following discharge   - Consider OT consult to assist with ADL evaluation and planning for discharge  - Provide patient education as appropriate  Outcome: Progressing  Goal: Maintains/Returns to pre admission functional level  Description: INTERVENTIONS:  - Perform BMAT or MOVE assessment daily  - Set and communicate daily mobility goal to care team and patient/family/caregiver  - Collaborate with rehabilitation services on mobility goals if consulted  - Perform Range of Motion 3 times a day  - Reposition patient every 2 hours    - Dangle patient 3 times a day  - Stand patient 3 times a day  - Ambulate patient 3 times a day  - Out of bed to chair 3 times a day   - Out of bed for meals 3 times a day  - Out of bed for toileting  - Record patient progress and toleration of activity level   Outcome: Progressing  Goal: Patient will remain free of falls  Description: INTERVENTIONS:  - Educate patient/family on patient safety including physical limitations  - Instruct patient to call for assistance with activity   - Consult OT/PT to assist with strengthening/mobility   - Keep Call bell within reach  - Keep bed low and locked with side rails adjusted as appropriate  - Keep care items and personal belongings within reach  - Initiate and maintain comfort rounds  - Make Fall Risk Sign visible to staff  - Offer Toileting every 2 Hours, in advance of need  - Initiate/Maintain bed alarm  - Obtain necessary fall risk management equipment: yellow bracelet  - Apply yellow socks and bracelet for high fall risk patients  - Consider moving patient to room near nurses station  Outcome: Progressing     Problem: DISCHARGE PLANNING  Goal: Discharge to home or other facility with appropriate resources  Description: INTERVENTIONS:  - Identify barriers to discharge w/patient and caregiver  - Arrange for needed discharge resources and transportation as appropriate  - Identify discharge learning needs (meds, wound care, etc )  - Arrange for interpretive services to assist at discharge as needed  - Refer to Case Management Department for coordinating discharge planning if the patient needs post-hospital services based on physician/advanced practitioner order or complex needs related to functional status, cognitive ability, or social support system  Outcome: Progressing

## 2022-02-22 NOTE — UTILIZATION REVIEW
Initial Clinical Review  [facility] bed on 02/15 @ 1408 converted to Inpatient on 02/16 @ [address] for continued treatment of pt s/p TKA, right with on going pain control management and  PT assessment for safe discharge  Admission: Date/Time/Statement:   Admission Orders (From admission, onward)     Ordered        02/16/22 0753  Inpatient Admission  Once                      Orders Placed This Encounter   Procedures    Inpatient Admission     Standing Status:   Standing     Number of Occurrences:   1     Order Specific Question:   Level of Care     Answer:   Med Surg [16]     Order Specific Question:   Estimated length of stay     Answer:   More than 2 Midnights     Order Specific Question:   Certification     Answer:   I certify that inpatient services are medically necessary for this patient for a duration of greater than two midnights  See H&P and MD Progress Notes for additional information about the patient's course of treatment  Initial Presentation: 66year old female presented to [facility] for OP scheduled TKA of right knee d/t worsening pain  Pt was changed to inpatient admission on 02/16 d/t new onset of exacerbation of pain, gait instability or difficulty walking, decreased or limited mobility, ROM, weight bearing, impaired functional status and balance deficiency; risk for falling related to S/P RIGHT Total Knee Replacement  02/16   Orthopedics Notes: Will require at least two night stay to be assessed by PT for discharge to home with home care services or to a skilled nursing facility  Reinfusion Hemovac discontinued  Drain removed  Details of dressing change and positioning of knee in bed with no catching maintaining full EXT to avoid flexion contracture  PE: AAQ x 3  Incision clean, dressings dry  Dressing change today  Hemovac D/C'ed atraumatically  Distal neurovascular intact  No S/S VTED  IM Consult: S/P TKR (total knee replacement) using cement, right  Medical management    ABLA noted as evidence by 11 4 pre-op to 9 3 post-op  Would hold off on transfusion unless Hgb <7  Blood pressure acceptable   Resume home regimen    Monitor with routine vitals     Date: 02/17   Day 2:     ED Triage Vitals   Temperature Pulse Respirations Blood Pressure SpO2   02/15/22 0804 02/15/22 0804 02/15/22 0804 02/15/22 0804 02/15/22 0804   98 °F (36 7 °C) 81 16 157/70 97 %      Temp Source Heart Rate Source Patient Position - Orthostatic VS BP Location FiO2 (%)   02/15/22 0804 02/15/22 1013 02/15/22 1013 02/15/22 1013 --   Temporal Monitor Lying Right arm       Pain Score       02/15/22 0804       7          Wt Readings from Last 1 Encounters:   02/15/22 92 kg (202 lb 13 2 oz)     Additional Vital Signs:   Date/Time Temp Pulse Resp BP MAP (mmHg) SpO2 Calculated FIO2 (%) - Nasal Cannula Nasal Cannula O2 Flow Rate (L/min) O2 Device Cardiac (WDL) Patient Position - Orthostatic VS   02/16/22 1116 96 °F (35 6 °C) Abnormal  62 20 121/64 90 96 % -- -- None (Room air) -- Lying   02/16/22 0700 95 9 °F (35 5 °C) Abnormal  67 -- 124/59 -- 94 % -- -- None (Room air) -- --   02/16/22 0407 97 8 °F (36 6 °C) 77 20 140/75 -- 98 % -- -- -- -- Lying   02/15/22 2300 98 2 °F (36 8 °C) 82 20 120/81 -- 95 % -- -- None (Room air) -- Lying   02/15/22 1921 97 2 °F (36 2 °C) Abnormal  69 -- 121/68 -- 94 % -- -- None (Room air) -- Lying   02/15/22 1604 97 6 °F (36 4 °C) 72 14 125/74 93 92 % -- -- None (Room air) -- Lying   02/15/22 1531 -- 58 12 109/59 82 94 % 28 2 L/min Nasal cannula -- --   02/15/22 1515 98 1 °F (36 7 °C) 60 12 113/55 79 95 % 28 2 L/min Nasal cannula -- --   02/15/22 1504 -- 58 13 116/54 78 94 % 28 2 L/min Nasal cannula -- --   02/15/22 1451 -- 60 14 -- -- 94 % 28 2 L/min Nasal cannula -- --   02/15/22 1445 -- 62 16 102/54 75 92 % 32 3 L/min Nasal cannula -- --   02/15/22 1423 -- -- -- -- -- -- 40 5 L/min Nasal cannula -- --   02/15/22 1419 -- 60 12 -- -- 98 % 44 6 L/min Simple mask -- --   02/15/22 1418 -- 60 12 -- -- 98 % 44 6 L/min Simple mask -- --   02/15/22 1411 -- 54 Abnormal  13 105/54 77 90 % 28 2 L/min Nasal cannula -- --   02/15/22 1356 98 2 °F (36 8 °C) 64 18 110/53 76 93 % 32 3 L/min Nasal cannula WDL --   02/15/22 1043 -- 68 16 114/58 -- 99 % 32 3 L/min Nasal cannula -- Lying   02/15/22 1036 -- 66 16 117/54 -- 99 % 32 3 L/min Nasal cannula -- Lying   02/15/22 1025 -- 64 16 126/68 -- 98 % 32 3 L/min Nasal cannula -- Lying   02/15/22 1013 -- 68 16 176/119 Abnormal  -- 98 % -- -- None (Room air) -- Lying       Pertinent Labs/Diagnostic Test Results:   XR right knee: Unremarkable appearance of total knee arthroplasty          Results from last 7 days   Lab Units 02/18/22  0837 02/17/22 0441 02/16/22  0421   WBC Thousand/uL  --  5 75  --    HEMOGLOBIN g/dL 9 0* 8 7* 9 3*   HEMATOCRIT % 29 7* 29 5* 31 5*   PLATELETS Thousands/uL  --  135*  --    NEUTROS ABS Thousands/µL  --  4 34  --          Results from last 7 days   Lab Units 02/18/22  0428 02/17/22 0441 02/16/22  0421   SODIUM mmol/L 139 140 137   POTASSIUM mmol/L 4 2 4 2 4 7   CHLORIDE mmol/L 103 103 103   CO2 mmol/L 27 28 29   ANION GAP mmol/L 9 9 5   BUN mg/dL 14 14 18   CREATININE mg/dL 0 72 0 81 1 09   EGFR ml/min/1 73sq m 83 72 50   CALCIUM mg/dL 8 8 8 2* 8 4         Results from last 7 days   Lab Units 02/17/22  0704   POC GLUCOSE mg/dl 125     Results from last 7 days   Lab Units 02/18/22  0428 02/17/22 0441 02/16/22  0421   GLUCOSE RANDOM mg/dL 102 127 128                 Past Medical History:   Diagnosis Date    Anemia     Anxiety     Arthritis     right knee    Asthma     childhood    Chronic sore throat     since intubation yrs ago    Depression     Disease of thyroid gland     hypothyroid    Headache     frequently    Hx of gastric bypass     Hypertension     S/P TKR (total knee replacement) using cement, right 2/16/2022    Wears dentures     full uppers     Present on Admission:  **None**      Admitting Diagnosis: Pain in right knee [M25 561]  Unilateral primary osteoarthritis, right knee [M17 11]  Age/Sex: 66 y o  female  Admission Orders:  SCD  PT  I/O  Neurovascular checks  WBAT on RLE  Scheduled Medications:  No current facility-administered medications for this encounter  Continuous IV Infusions:  lactated ringers infusion  Rate: 125 mL/hr Dose: 125 mL/hr  Freq: Continuous Route: IV  Indications of Use: IV Hydration  Last Dose: Stopped (02/16/22 0806)  Start: 02/15/22 0800 End: 02/16/22 0919  No current facility-administered medications for this encounter  PRN Meds:  acetaminophen, 650 mg, Oral, Q4H PRN  HYDROmorphone, 0 2 mg, Intravenous, Q6H PRN  HYDROmorphone, 0 5 mg, Intravenous, Q2H PRN  ondansetron, 4 mg, Oral, Q6H PRN 02/16 x 1  oxyCODONE-acetaminophen, 1 tablet, Oral, Q4H PRN 02/15 x 1, 02/16 x 3        IP CONSULT TO CASE MANAGEMENT  IP CONSULT TO NUTRITION SERVICES  IP CONSULT TO INTERNAL MEDICINE    Network Utilization Review Department  ATTENTION: Please call with any questions or concerns to [phone] and carefully listen to the prompts so that you are directed to the right person  All voicemails are confidential   Raoul Mccoy all requests for admission clinical reviews, approved or denied determinations and any other requests to dedicated fax number below belonging to the campus where the patient is receiving treatment   List of dedicated fax numbers for the Facilities:  [address] DENIALS (Administrative/Medical Necessity) [phone]   [address] (Maternity/NICU/Pediatrics) [phone]   [address] [place] [address] [address] [facility] Haley Perry 1481 [address] [phone]

## 2022-02-24 DIAGNOSIS — Z96.651 S/P TKR (TOTAL KNEE REPLACEMENT) USING CEMENT, RIGHT: Primary | ICD-10-CM

## 2022-02-24 DIAGNOSIS — M25.561 ACUTE PAIN OF RIGHT KNEE: ICD-10-CM

## 2023-09-06 NOTE — DISCHARGE INSTRUCTIONS
For Your Information   If you are in need of a medication refill please use one of the following options. You can expect your medication to be filled within 2-3 business days.   1. Call your pharmacy for all medication refills and renewals.   2. [email or web address]- [email or web address]  3. Call your providers office    If your provider ordered any imaging for you today. Our pre-scheduling services will be reaching out to you within 2 business days to schedule this. [facility] can be reached by calling [phone]     If your provider ordered testing today, you will be notified of your test results within 3-5 business days unless specified otherwise. If you have not received your results within 5 business days please call your provider's office.    You may be receiving a survey.  Please take the time to complete this, as your feedback is very important to us!  We strive to make your experience exceptional and your comments help us with that goal.  We look forward to hearing from you!    For all future appointments please arrive 15 minutes prior to your scheduled visit.     Patient Contact Center Business Office: assistance with medical billing & financial inquires [phone]           TOTAL KNEE REPLACEMENT POST OPERATIVE INSTRUCTIONS    You have had a total knee replacement  Joint replacement surgery requires that you take an active role in your care and rehabilitation  This sheet provides important information about your care in the early postoperative period  Remember to schedule a follow-up appointment for 6 weeks from the date of surgery  Wound Care     Dressings are to be kept clean and dry  A small amount of clear drainage or bleeding is normal   If this is happening, the dressing should be changed daily   You may get the incision wet when showering 2-3 days after surgery  The shower should be brief and the wound patted dry with a clean towel  No baths or soaking the incision until 2-3 weeks after surgery and scabs are absent   Staples or stitches are usually removed 2 to 2 5 weeks after surgery   If purulent drainage (thick white or greenish in color) is coming from the wound, or the wound has increasing redness, or if you have a temperature of 101 or higher, please report these symptoms to our office   Your temperature maybe slightly elevated for several days after surgery  However, if fever persists above 101° F and is accompanied by chills, sweats, increased pain or drainage at the incision, you should call the office  These may be signs of infection  Pain and Swelling     Ice your knee as frequently as possible  We recommend 4-5 times per day for 20 minutes per time  You may use either the ice bag given to you at the hospital or simply place ice in a zip lock bag and place on the knee   Narcotic pain medication can cause constipation and you may want to use an over the counter stool softener  Tylenol products may be used as an alternative to the prescribed pain medication  The prescribed narcotic medications should not be taken at the same time as plain Tylenol    Anti-inflammatory medications such as ibuprofen and Aleve have been shown to decrease healing if used on a regular basis  Please use these sparingly, if needed, for break through pain  If a refill of medication is needed, please call the office during regular business hours, Monday - Friday, 8:30 am to 4:30 pm   In general, refills will not be made after hours, so please plan ahead   Swelling to some degree is common after surgery  To reduce swelling, elevation is very helpful  Elevate the knee above the heart level (toes above the nose) for the first 2 - 5 days after surgery  Elevation for 30 minutes every 2 hours is a good initial recommendation  The white stockings are also designed to reduce swelling  Excessive pain and swelling should be reported to our office  Physical Therapy    The physical therapist will review some exercises such as quad sets, dangles, leg extension and straight leg raises  It is important to continue all the other exercises the physical therapist has shown you  For knee flexion, perform leg dangles over the edge of your bed twice a day  Use a pillow or rolled towel under the ankle of the operative leg to achieve full extension  When you can perform a straight leg raise on your own, you may discontinue use of the knee immobilizer  You may bear your full weight on the operative leg, as tolerated  The use of a walker for six weeks is helpful  Then a cane is helpful while you regain strength in the leg  A bag can be attached to your walker or crutches to assist you in carrying items  Remember, walking is the best exercise for your rehabilitation  When resting, be sure to completely straighten knee with pillow or blanket under calf keeping heel off bed to avoid heel sores  Unless the physical therapist or nursing staff has told you otherwise, there are no restrictions for the amount of weight that you can put on your knee  You require the assistance of a walker or can for 6 weeks after your surgery      Preventing Blood Clots     Moving your ankles up and down on a regular basis helps circulate blood from your legs to help prevent a blood clot   Use Sequential Compression Devices provided in the Knee Rehabilitation Kit   White compressive stockings help prevent swelling and blood from pooling in your legs  You do not need to sleep with them on  You should put them on first thing in the morning  They are recommended for the first six (6) weeks after surgery  All of the following medications are recommended for DVT/VTED prevention and prophylaxis  Dr Mireya Graham has recommended the following medication:          Fondaparinux Sodium  (Arixtra) is a Factor Xa inhibitor (anticoagulant) indicated for prophylaxis of deep vein thrombosis (DVT) in patients undergoing knee replacement surgery  The recommended dose of ARIXTRA is 2 5 mg administered by subcutaneous injection once daily  The usual duration of therapy is twenty-one (21) days post operatively  Driving  To drive you must no longer be taking narcotic pain pills  (Plain Tylenol is allowed)  Also, you must feel strong and alert  Most people start driving 6 weeks after surgery  Follow-up  Make sure an appointment has been scheduled for you at our office for approximately 6 weeks after surgery  Antibiotics  Prior to any dental, urological, gastrointestinal or surgical procedure, you must notify your doctor that you have a joint replacement  You may need to take antibiotics to protect the joint replacement from infection  This information should serve as a general guide for you in the care of your knee replacement after surgery  As this is general information, your own care may be modified by Dr Mireya Graham based upon the specific nature of your surgery and general condition  We hope you will ask questions about this information  If there is something concerning or that you dont understand, please call our office at the [phone]        Face to Face Encounter    Please evaluate Kay Zabala, : 1950 for admission to [address] [place] [address]  Disciplines requested: Nursing and Physical Therapy    Physician to follow patient's care (the person listed here will be responsible for signing ongoing orders): Referring Provider    Requested Start of Care Date: Within 48 hours of discharge  Special Instructions:       I attest that I or another qualified licensed provider saw Kay Zabala 90 days prior to or 30 days post admission and this face to face encounter meets the necessary Home Health requirements  The face to face encounter occurred on 22  The encounter with the patient was in whole, or in part, for the following medical condition, which is the primary reason for home health care:   Principal Problem:    S/P TKR (total knee replacement) using cement, right  Active Problems:    Asthma    Hypertension    Disease of thyroid gland    Hx of gastric bypass    Obesity       I certify that, based on my clinical findings, the services are medically necessary skilled home health services: Post-Op Care and Assessment, Restore Joint Function Post Joint Replacement, Gait/ADL Training and Evaluate Functional Status and Safety  Further, I certify that my clinical findings support this patient's homebound status (i e  absences from home require considerable and taxing effort, are for health treatment, or for attendance at [employer] events; absences from home for nonmedical reasons are infrequent or are of relatively short duration)  Homebound criteria are met because: Uses an Assist Device (i e  cane, walker, etc) and Requires the Assistance of Another Person for Safe Ambulation or to Leave the Home and Limited Endurance and Fatigues [employer] in [employer]  This patient is under my care, and I have initiated the establishment of the plan of care  This patient will be followed by a physician who will periodically review the plan of care

## (undated) DEVICE — SUT VICRYL 1 CT-1 27 IN J261H

## (undated) DEVICE — SURETRANS AUTOTRANSFUSION SYSTEM FOR ORTHOPAEDICS WITH PVC DRAIN AND 2 TROCARS: Brand: SURETRANS AUTOTRANSFUSION SYSTEM FOR ORTHOPAEDICS

## (undated) DEVICE — CURETTE QUIK-USE F/ BN PREP

## (undated) DEVICE — BETHLEHEM UNIV TOTAL KNEE, KIT: Brand: CARDINAL HEALTH

## (undated) DEVICE — COBAN 6 IN STERILE

## (undated) DEVICE — DRAPE SHEET THREE QUARTER

## (undated) DEVICE — BULB SYRINGE,IRRIGATION WITH PROTECTIVE CAP: Brand: DOVER

## (undated) DEVICE — GLOVE INDICATOR PI UNDERGLOVE SZ 7.5 BLUE

## (undated) DEVICE — SAW BLADE RECIPROCATING 179

## (undated) DEVICE — COOL TEMP PAD

## (undated) DEVICE — MEDIUM SOFT CONE SPLASH SHIELD

## (undated) DEVICE — OCCLUSIVE GAUZE STRIP,3% BISMUTH TRIBROMOPHENATE IN PETROLATUM BLEND: Brand: XEROFORM

## (undated) DEVICE — SYRINGE 3ML LL

## (undated) DEVICE — POV-IOD SOLUTION 4OZ BT

## (undated) DEVICE — CEMENT MIXING BOWL W/SPATULA

## (undated) DEVICE — SPONGE LAP 18 X 18 IN STRL RFD

## (undated) DEVICE — CUFF TOURNIQUET 30 X 4 IN QUICK CONNECT DISP 1BLA

## (undated) DEVICE — HOOD: Brand: FLYTE, SURGICOOL

## (undated) DEVICE — SUT FIBERWIRE #2 1/2 CIRCLE T-5 38IN AR-7200

## (undated) DEVICE — 3M™ DURAPORE™ SURGICAL TAPE 1538-3, 3 INCH X 10 YARD (7,5CM X 9,1M), 4 ROLLS/BOX: Brand: 3M™ DURAPORE™

## (undated) DEVICE — 60 ML SYRINGE,TOOMEY TYPE: Brand: MONOJECT

## (undated) DEVICE — PROXIMATE SKIN STAPLERS (35 WIDE) CONTAINS 35 STAINLESS STEEL STAPLES (FIXED HEAD): Brand: PROXIMATE

## (undated) DEVICE — PADDING CAST 6IN COTTON STRL

## (undated) DEVICE — ARTHROSCOPY FLOOR MAT

## (undated) DEVICE — 3000CC GUARDIAN II: Brand: GUARDIAN

## (undated) DEVICE — SUT MONOCRYL 2-0 SH 27 IN Y417H

## (undated) DEVICE — ABDOMINAL PAD: Brand: DERMACEA

## (undated) DEVICE — PLUMEPEN PRO 10FT

## (undated) DEVICE — ACE WRAP 6 IN UNSTERILE

## (undated) DEVICE — WEBRIL 6 IN UNSTERILE

## (undated) DEVICE — INTENDED FOR TISSUE SEPARATION, AND OTHER PROCEDURES THAT REQUIRE A SHARP SURGICAL BLADE TO PUNCTURE OR CUT.: Brand: BARD-PARKER SAFETY BLADES SIZE 10, STERILE

## (undated) DEVICE — INTENDED FOR TISSUE SEPARATION, AND OTHER PROCEDURES THAT REQUIRE A SHARP SURGICAL BLADE TO PUNCTURE OR CUT.: Brand: BARD-PARKER ® CARBON RIB-BACK BLADES

## (undated) DEVICE — GLOVE SRG BIOGEL 8

## (undated) DEVICE — IMPERVIOUS STOCKINETTE: Brand: DEROYAL

## (undated) DEVICE — GAUZE SPONGES,16 PLY: Brand: CURITY

## (undated) DEVICE — 3M™ IOBAN™ 2 ANTIMICROBIAL INCISE DRAPE 6648EZ: Brand: IOBAN™ 2

## (undated) DEVICE — GLOVE SRG BIOGEL 7.5

## (undated) DEVICE — GLOVE INDICATOR PI UNDERGLOVE SZ 8 BLUE

## (undated) DEVICE — SCD SEQUENTIAL COMPRESSION COMFORT SLEEVE MEDIUM KNEE LENGTH: Brand: KENDALL SCD

## (undated) DEVICE — NEEDLE 18 G X 1 1/2

## (undated) DEVICE — TIBURON SPLIT SHEET: Brand: CONVERTORS

## (undated) DEVICE — SAW BLADE OSCILLATING BRAZOL 167

## (undated) DEVICE — SUT MONOCRYL 3-0 PS-2 27 IN Y427H

## (undated) DEVICE — CAPIT KNEE SIGMA FB CMNTD W COCR/GVF-XLINK